# Patient Record
Sex: FEMALE | Race: WHITE | Employment: PART TIME | URBAN - METROPOLITAN AREA
[De-identification: names, ages, dates, MRNs, and addresses within clinical notes are randomized per-mention and may not be internally consistent; named-entity substitution may affect disease eponyms.]

---

## 2018-01-01 ENCOUNTER — TRANSFERRED RECORDS (OUTPATIENT)
Dept: HEALTH INFORMATION MANAGEMENT | Facility: CLINIC | Age: 63
End: 2018-01-01

## 2018-01-01 ENCOUNTER — HOSPITAL ENCOUNTER (INPATIENT)
Facility: CLINIC | Age: 63
LOS: 4 days | Discharge: HOME | End: 2018-11-05
Attending: OBSTETRICS & GYNECOLOGY | Admitting: OBSTETRICS & GYNECOLOGY
Payer: COMMERCIAL

## 2018-01-01 ENCOUNTER — HOSPITAL ENCOUNTER (OUTPATIENT)
Facility: CLINIC | Age: 63
Discharge: HOME | End: 2018-08-17
Attending: RADIOLOGY | Admitting: RADIOLOGY
Payer: COMMERCIAL

## 2018-01-01 ENCOUNTER — TELEPHONE (OUTPATIENT)
Dept: FAMILY MEDICINE | Facility: CLINIC | Age: 63
End: 2018-01-01

## 2018-01-01 ENCOUNTER — APPOINTMENT (OUTPATIENT)
Dept: LAB | Facility: CLINIC | Age: 63
End: 2018-01-01
Payer: COMMERCIAL

## 2018-01-01 ENCOUNTER — APPOINTMENT (OUTPATIENT)
Dept: INTERVENTIONAL RADIOLOGY/VASCULAR | Facility: CLINIC | Age: 63
End: 2018-01-01
Attending: OBSTETRICS & GYNECOLOGY
Payer: COMMERCIAL

## 2018-01-01 ENCOUNTER — OFFICE VISIT (OUTPATIENT)
Dept: FAMILY MEDICINE | Facility: CLINIC | Age: 63
End: 2018-01-01
Payer: COMMERCIAL

## 2018-01-01 ENCOUNTER — ANESTHESIA (OUTPATIENT)
Dept: SURGERY | Facility: CLINIC | Age: 63
End: 2018-01-01
Payer: COMMERCIAL

## 2018-01-01 ENCOUNTER — ANESTHESIA EVENT (OUTPATIENT)
Dept: SURGERY | Facility: CLINIC | Age: 63
End: 2018-01-01
Payer: COMMERCIAL

## 2018-01-01 VITALS
TEMPERATURE: 96.4 F | BODY MASS INDEX: 35.48 KG/M2 | HEART RATE: 99 BPM | RESPIRATION RATE: 16 BRPM | OXYGEN SATURATION: 96 % | DIASTOLIC BLOOD PRESSURE: 75 MMHG | WEIGHT: 234.13 LBS | HEIGHT: 68 IN | SYSTOLIC BLOOD PRESSURE: 145 MMHG

## 2018-01-01 VITALS
OXYGEN SATURATION: 95 % | RESPIRATION RATE: 16 BRPM | TEMPERATURE: 98.2 F | HEIGHT: 69 IN | HEART RATE: 79 BPM | BODY MASS INDEX: 35.7 KG/M2 | SYSTOLIC BLOOD PRESSURE: 116 MMHG | WEIGHT: 241 LBS | DIASTOLIC BLOOD PRESSURE: 83 MMHG

## 2018-01-01 VITALS
TEMPERATURE: 97.1 F | RESPIRATION RATE: 16 BRPM | WEIGHT: 239.5 LBS | SYSTOLIC BLOOD PRESSURE: 150 MMHG | HEIGHT: 68 IN | DIASTOLIC BLOOD PRESSURE: 81 MMHG | OXYGEN SATURATION: 98 % | HEART RATE: 87 BPM | BODY MASS INDEX: 36.3 KG/M2

## 2018-01-01 DIAGNOSIS — C80.0 CARCINOMATOSIS (H): Primary | ICD-10-CM

## 2018-01-01 DIAGNOSIS — C80.0 CARCINOMATOSIS (H): ICD-10-CM

## 2018-01-01 DIAGNOSIS — E03.9 HYPOTHYROIDISM, UNSPECIFIED TYPE: ICD-10-CM

## 2018-01-01 DIAGNOSIS — G62.0 CHEMOTHERAPY-INDUCED PERIPHERAL NEUROPATHY (H): ICD-10-CM

## 2018-01-01 DIAGNOSIS — C57.00 CARCINOMA OF FALLOPIAN TUBE, UNSPECIFIED LATERALITY (H): ICD-10-CM

## 2018-01-01 DIAGNOSIS — Z01.818 PREOP GENERAL PHYSICAL EXAM: Primary | ICD-10-CM

## 2018-01-01 DIAGNOSIS — R18.0 ASCITES, MALIGNANT (H): ICD-10-CM

## 2018-01-01 DIAGNOSIS — C57.00 FALLOPIAN TUBE CARCINOMA (H): ICD-10-CM

## 2018-01-01 DIAGNOSIS — T45.1X5A CHEMOTHERAPY-INDUCED PERIPHERAL NEUROPATHY (H): ICD-10-CM

## 2018-01-01 DIAGNOSIS — G62.9 NEUROPATHY: ICD-10-CM

## 2018-01-01 LAB
ABO + RH BLD: NORMAL
ABO + RH BLD: NORMAL
ALBUMIN SERPL-MCNC: 3.1 G/DL (ref 3.4–5)
ALBUMIN SERPL-MCNC: 3.5 G/DL (ref 3.4–5)
ALP SERPL-CCNC: 38 U/L (ref 40–150)
ALP SERPL-CCNC: 47 U/L (ref 40–150)
ALT SERPL W P-5'-P-CCNC: 18 U/L (ref 0–50)
ALT SERPL W P-5'-P-CCNC: 26 U/L (ref 0–50)
ANION GAP SERPL CALCULATED.3IONS-SCNC: 6 MMOL/L (ref 3–14)
ANION GAP SERPL CALCULATED.3IONS-SCNC: 6 MMOL/L (ref 3–14)
APTT PPP: 25 SEC (ref 22–37)
AST SERPL W P-5'-P-CCNC: 15 U/L (ref 0–45)
AST SERPL W P-5'-P-CCNC: 19 U/L (ref 0–45)
BACTERIA SPEC CULT: NO GROWTH
BACTERIA SPEC CULT: NORMAL
BASOPHILS # BLD AUTO: 0 10E9/L (ref 0–0.2)
BASOPHILS # BLD AUTO: 0 10E9/L (ref 0–0.2)
BASOPHILS NFR BLD AUTO: 0 %
BASOPHILS NFR BLD AUTO: 0.7 %
BILIRUB SERPL-MCNC: 0.2 MG/DL (ref 0.2–1.3)
BILIRUB SERPL-MCNC: 0.5 MG/DL (ref 0.2–1.3)
BLD GP AB SCN SERPL QL: NORMAL
BLOOD BANK CMNT PATIENT-IMP: NORMAL
BUN SERPL-MCNC: 8 MG/DL (ref 7–30)
BUN SERPL-MCNC: 8 MG/DL (ref 7–30)
CALCIUM SERPL-MCNC: 7.6 MG/DL (ref 8.5–10.1)
CALCIUM SERPL-MCNC: 8.6 MG/DL (ref 8.5–10.1)
CHLORIDE SERPL-SCNC: 107 MMOL/L (ref 94–109)
CHLORIDE SERPL-SCNC: 108 MMOL/L (ref 94–109)
CO2 SERPL-SCNC: 26 MMOL/L (ref 20–32)
CO2 SERPL-SCNC: 27 MMOL/L (ref 20–32)
COPATH REPORT: NORMAL
CREAT SERPL-MCNC: 0.67 MG/DL (ref 0.52–1.04)
CREAT SERPL-MCNC: 0.77 MG/DL (ref 0.52–1.04)
CREAT SERPL-MCNC: 0.78 MG/DL (ref 0.52–1.04)
CREAT SERPL-MCNC: 0.86 MG/DL (ref 0.52–1.04)
DIFFERENTIAL METHOD BLD: ABNORMAL
DIFFERENTIAL METHOD BLD: ABNORMAL
EOSINOPHIL # BLD AUTO: 0 10E9/L (ref 0–0.7)
EOSINOPHIL # BLD AUTO: 0 10E9/L (ref 0–0.7)
EOSINOPHIL NFR BLD AUTO: 0.1 %
EOSINOPHIL NFR BLD AUTO: 1 %
ERYTHROCYTE [DISTWIDTH] IN BLOOD BY AUTOMATED COUNT: 18.1 % (ref 10–15)
ERYTHROCYTE [DISTWIDTH] IN BLOOD BY AUTOMATED COUNT: 18.4 % (ref 10–15)
GFR SERPL CREATININE-BSD FRML MDRD: 67 ML/MIN/1.7M2
GFR SERPL CREATININE-BSD FRML MDRD: 75 ML/MIN/1.7M2
GFR SERPL CREATININE-BSD FRML MDRD: 76 ML/MIN/1.7M2
GFR SERPL CREATININE-BSD FRML MDRD: 89 ML/MIN/1.7M2
GLUCOSE BLDC GLUCOMTR-MCNC: 106 MG/DL (ref 70–99)
GLUCOSE SERPL-MCNC: 153 MG/DL (ref 70–99)
GLUCOSE SERPL-MCNC: 84 MG/DL (ref 70–99)
HCT VFR BLD AUTO: 36.3 % (ref 35–47)
HCT VFR BLD AUTO: 37.2 % (ref 35–47)
HGB BLD-MCNC: 11.6 G/DL (ref 11.7–15.7)
HGB BLD-MCNC: 11.7 G/DL (ref 11.7–15.7)
IMM GRANULOCYTES # BLD: 0 10E9/L (ref 0–0.4)
IMM GRANULOCYTES NFR BLD: 0.1 %
INR PPP: 1.01 (ref 0.86–1.14)
LYMPHOCYTES # BLD AUTO: 0.5 10E9/L (ref 0.8–5.3)
LYMPHOCYTES # BLD AUTO: 1 10E9/L (ref 0.8–5.3)
LYMPHOCYTES NFR BLD AUTO: 23.8 %
LYMPHOCYTES NFR BLD AUTO: 8 %
Lab: NORMAL
MAGNESIUM SERPL-MCNC: 2 MG/DL (ref 1.6–2.3)
MCH RBC QN AUTO: 27.7 PG (ref 26.5–33)
MCH RBC QN AUTO: 27.9 PG (ref 26.5–33)
MCHC RBC AUTO-ENTMCNC: 31.5 G/DL (ref 31.5–36.5)
MCHC RBC AUTO-ENTMCNC: 32 G/DL (ref 31.5–36.5)
MCV RBC AUTO: 87 FL (ref 78–100)
MCV RBC AUTO: 88 FL (ref 78–100)
MONOCYTES # BLD AUTO: 0.4 10E9/L (ref 0–1.3)
MONOCYTES # BLD AUTO: 0.5 10E9/L (ref 0–1.3)
MONOCYTES NFR BLD AUTO: 8 %
MONOCYTES NFR BLD AUTO: 9.7 %
NEUTROPHILS # BLD AUTO: 2.7 10E9/L (ref 1.6–8.3)
NEUTROPHILS # BLD AUTO: 5.7 10E9/L (ref 1.6–8.3)
NEUTROPHILS NFR BLD AUTO: 64.8 %
NEUTROPHILS NFR BLD AUTO: 83.8 %
NRBC # BLD AUTO: 0 10*3/UL
NRBC BLD AUTO-RTO: 0 /100
PLATELET # BLD AUTO: 132 10E9/L (ref 150–450)
PLATELET # BLD AUTO: 133 10E9/L (ref 150–450)
PLATELET # BLD AUTO: 148 10E9/L (ref 150–450)
PLATELET # BLD AUTO: 185 10E9/L (ref 150–450)
POTASSIUM SERPL-SCNC: 4 MMOL/L (ref 3.4–5.3)
POTASSIUM SERPL-SCNC: 4.4 MMOL/L (ref 3.4–5.3)
PROT SERPL-MCNC: 6.1 G/DL (ref 6.8–8.8)
PROT SERPL-MCNC: 7.4 G/DL (ref 6.8–8.8)
RBC # BLD AUTO: 4.16 10E12/L (ref 3.8–5.2)
RBC # BLD AUTO: 4.23 10E12/L (ref 3.8–5.2)
SODIUM SERPL-SCNC: 139 MMOL/L (ref 133–144)
SODIUM SERPL-SCNC: 141 MMOL/L (ref 133–144)
SPECIMEN EXP DATE BLD: NORMAL
SPECIMEN SOURCE: NORMAL
SPECIMEN SOURCE: NORMAL
WBC # BLD AUTO: 4.1 10E9/L (ref 4–11)
WBC # BLD AUTO: 6.8 10E9/L (ref 4–11)

## 2018-01-01 PROCEDURE — 27210886 ZZH ACCESSORY CR5

## 2018-01-01 PROCEDURE — 25000132 ZZH RX MED GY IP 250 OP 250 PS 637: Performed by: NURSE PRACTITIONER

## 2018-01-01 PROCEDURE — 85049 AUTOMATED PLATELET COUNT: CPT | Performed by: NURSE PRACTITIONER

## 2018-01-01 PROCEDURE — 25000128 H RX IP 250 OP 636: Performed by: NURSE PRACTITIONER

## 2018-01-01 PROCEDURE — 07TC0ZZ RESECTION OF PELVIS LYMPHATIC, OPEN APPROACH: ICD-10-PCS | Performed by: OBSTETRICS & GYNECOLOGY

## 2018-01-01 PROCEDURE — 27210995 ZZH RX 272: Performed by: OBSTETRICS & GYNECOLOGY

## 2018-01-01 PROCEDURE — 25800025 ZZH RX 258: Performed by: NURSE PRACTITIONER

## 2018-01-01 PROCEDURE — 0DBW0ZZ EXCISION OF PERITONEUM, OPEN APPROACH: ICD-10-PCS | Performed by: OBSTETRICS & GYNECOLOGY

## 2018-01-01 PROCEDURE — 88307 TISSUE EXAM BY PATHOLOGIST: CPT | Performed by: OBSTETRICS & GYNECOLOGY

## 2018-01-01 PROCEDURE — 25000125 ZZHC RX 250: Performed by: NURSE ANESTHETIST, CERTIFIED REGISTERED

## 2018-01-01 PROCEDURE — 25800025 ZZH RX 258: Performed by: OBSTETRICS & GYNECOLOGY

## 2018-01-01 PROCEDURE — 83735 ASSAY OF MAGNESIUM: CPT | Performed by: FAMILY MEDICINE

## 2018-01-01 PROCEDURE — 88307 TISSUE EXAM BY PATHOLOGIST: CPT | Mod: 26 | Performed by: OBSTETRICS & GYNECOLOGY

## 2018-01-01 PROCEDURE — 0UT20ZZ RESECTION OF BILATERAL OVARIES, OPEN APPROACH: ICD-10-PCS | Performed by: OBSTETRICS & GYNECOLOGY

## 2018-01-01 PROCEDURE — 36415 COLL VENOUS BLD VENIPUNCTURE: CPT | Performed by: OBSTETRICS & GYNECOLOGY

## 2018-01-01 PROCEDURE — 0TN60ZZ RELEASE RIGHT URETER, OPEN APPROACH: ICD-10-PCS | Performed by: OBSTETRICS & GYNECOLOGY

## 2018-01-01 PROCEDURE — 25000128 H RX IP 250 OP 636: Performed by: ANESTHESIOLOGY

## 2018-01-01 PROCEDURE — 0TN70ZZ RELEASE LEFT URETER, OPEN APPROACH: ICD-10-PCS | Performed by: OBSTETRICS & GYNECOLOGY

## 2018-01-01 PROCEDURE — 25000125 ZZHC RX 250

## 2018-01-01 PROCEDURE — 76937 US GUIDE VASCULAR ACCESS: CPT

## 2018-01-01 PROCEDURE — 0UT90ZZ RESECTION OF UTERUS, OPEN APPROACH: ICD-10-PCS | Performed by: OBSTETRICS & GYNECOLOGY

## 2018-01-01 PROCEDURE — 88305 TISSUE EXAM BY PATHOLOGIST: CPT | Mod: 26 | Performed by: OBSTETRICS & GYNECOLOGY

## 2018-01-01 PROCEDURE — 25000128 H RX IP 250 OP 636: Performed by: OBSTETRICS & GYNECOLOGY

## 2018-01-01 PROCEDURE — 12000000 ZZH R&B MED SURG/OB

## 2018-01-01 PROCEDURE — 36415 COLL VENOUS BLD VENIPUNCTURE: CPT | Performed by: RADIOLOGY

## 2018-01-01 PROCEDURE — 88309 TISSUE EXAM BY PATHOLOGIST: CPT | Performed by: OBSTETRICS & GYNECOLOGY

## 2018-01-01 PROCEDURE — C1765 ADHESION BARRIER: HCPCS | Performed by: OBSTETRICS & GYNECOLOGY

## 2018-01-01 PROCEDURE — 85730 THROMBOPLASTIN TIME PARTIAL: CPT | Performed by: RADIOLOGY

## 2018-01-01 PROCEDURE — 71000012 ZZH RECOVERY PHASE 1 LEVEL 1 FIRST HR: Performed by: OBSTETRICS & GYNECOLOGY

## 2018-01-01 PROCEDURE — 0DNW0ZZ RELEASE PERITONEUM, OPEN APPROACH: ICD-10-PCS | Performed by: OBSTETRICS & GYNECOLOGY

## 2018-01-01 PROCEDURE — 86900 BLOOD TYPING SEROLOGIC ABO: CPT | Performed by: OBSTETRICS & GYNECOLOGY

## 2018-01-01 PROCEDURE — 00000155 ZZHCL STATISTIC H-CELL BLOCK W/STAIN: Performed by: OBSTETRICS & GYNECOLOGY

## 2018-01-01 PROCEDURE — 86901 BLOOD TYPING SEROLOGIC RH(D): CPT | Performed by: OBSTETRICS & GYNECOLOGY

## 2018-01-01 PROCEDURE — 82565 ASSAY OF CREATININE: CPT | Performed by: NURSE PRACTITIONER

## 2018-01-01 PROCEDURE — 85025 COMPLETE CBC W/AUTO DIFF WBC: CPT | Performed by: NURSE PRACTITIONER

## 2018-01-01 PROCEDURE — 85610 PROTHROMBIN TIME: CPT | Performed by: RADIOLOGY

## 2018-01-01 PROCEDURE — 27210905 ZZH KIT CR7

## 2018-01-01 PROCEDURE — 99152 MOD SED SAME PHYS/QHP 5/>YRS: CPT

## 2018-01-01 PROCEDURE — 25000566 ZZH SEVOFLURANE, EA 15 MIN: Performed by: OBSTETRICS & GYNECOLOGY

## 2018-01-01 PROCEDURE — C1769 GUIDE WIRE: HCPCS

## 2018-01-01 PROCEDURE — 36415 COLL VENOUS BLD VENIPUNCTURE: CPT | Performed by: FAMILY MEDICINE

## 2018-01-01 PROCEDURE — 88112 CYTOPATH CELL ENHANCE TECH: CPT | Mod: 26 | Performed by: OBSTETRICS & GYNECOLOGY

## 2018-01-01 PROCEDURE — 99214 OFFICE O/P EST MOD 30 MIN: CPT | Performed by: FAMILY MEDICINE

## 2018-01-01 PROCEDURE — 0UT70ZZ RESECTION OF BILATERAL FALLOPIAN TUBES, OPEN APPROACH: ICD-10-PCS | Performed by: OBSTETRICS & GYNECOLOGY

## 2018-01-01 PROCEDURE — 88309 TISSUE EXAM BY PATHOLOGIST: CPT | Mod: 26,59 | Performed by: OBSTETRICS & GYNECOLOGY

## 2018-01-01 PROCEDURE — 25000125 ZZHC RX 250: Performed by: OBSTETRICS & GYNECOLOGY

## 2018-01-01 PROCEDURE — 80053 COMPREHEN METABOLIC PANEL: CPT | Performed by: FAMILY MEDICINE

## 2018-01-01 PROCEDURE — 88305 TISSUE EXAM BY PATHOLOGIST: CPT | Performed by: OBSTETRICS & GYNECOLOGY

## 2018-01-01 PROCEDURE — 25000125 ZZHC RX 250: Performed by: NURSE PRACTITIONER

## 2018-01-01 PROCEDURE — 27210794 ZZH OR GENERAL SUPPLY STERILE: Performed by: OBSTETRICS & GYNECOLOGY

## 2018-01-01 PROCEDURE — 36000093 ZZH SURGERY LEVEL 4 1ST 30 MIN: Performed by: OBSTETRICS & GYNECOLOGY

## 2018-01-01 PROCEDURE — 25000128 H RX IP 250 OP 636

## 2018-01-01 PROCEDURE — 40000170 ZZH STATISTIC PRE-PROCEDURE ASSESSMENT II: Performed by: OBSTETRICS & GYNECOLOGY

## 2018-01-01 PROCEDURE — 36000063 ZZH SURGERY LEVEL 4 EA 15 ADDTL MIN: Performed by: OBSTETRICS & GYNECOLOGY

## 2018-01-01 PROCEDURE — 25000128 H RX IP 250 OP 636: Performed by: NURSE ANESTHETIST, CERTIFIED REGISTERED

## 2018-01-01 PROCEDURE — 88112 CYTOPATH CELL ENHANCE TECH: CPT | Performed by: OBSTETRICS & GYNECOLOGY

## 2018-01-01 PROCEDURE — 25000128 H RX IP 250 OP 636: Performed by: RADIOLOGY

## 2018-01-01 PROCEDURE — C1788 PORT, INDWELLING, IMP: HCPCS

## 2018-01-01 PROCEDURE — 40000854 ZZH STATISTIC SIMPLE TUBE INSERTION/CHARGE, PORT, CATH, FISTULOGRAM

## 2018-01-01 PROCEDURE — 36415 COLL VENOUS BLD VENIPUNCTURE: CPT | Performed by: NURSE PRACTITIONER

## 2018-01-01 PROCEDURE — 37000008 ZZH ANESTHESIA TECHNICAL FEE, 1ST 30 MIN: Performed by: OBSTETRICS & GYNECOLOGY

## 2018-01-01 PROCEDURE — 25000132 ZZH RX MED GY IP 250 OP 250 PS 637: Performed by: OBSTETRICS & GYNECOLOGY

## 2018-01-01 PROCEDURE — 0DBU0ZZ EXCISION OF OMENTUM, OPEN APPROACH: ICD-10-PCS | Performed by: OBSTETRICS & GYNECOLOGY

## 2018-01-01 PROCEDURE — 85025 COMPLETE CBC W/AUTO DIFF WBC: CPT | Performed by: FAMILY MEDICINE

## 2018-01-01 PROCEDURE — 25000131 ZZH RX MED GY IP 250 OP 636 PS 637: Performed by: NURSE PRACTITIONER

## 2018-01-01 PROCEDURE — 71000013 ZZH RECOVERY PHASE 1 LEVEL 1 EA ADDTL HR: Performed by: OBSTETRICS & GYNECOLOGY

## 2018-01-01 PROCEDURE — 80053 COMPREHEN METABOLIC PANEL: CPT | Performed by: NURSE PRACTITIONER

## 2018-01-01 PROCEDURE — 86850 RBC ANTIBODY SCREEN: CPT | Performed by: OBSTETRICS & GYNECOLOGY

## 2018-01-01 PROCEDURE — 27211193 ZZ H WOUND GLUE CR1

## 2018-01-01 PROCEDURE — 00000146 ZZHCL STATISTIC GLUCOSE BY METER IP

## 2018-01-01 PROCEDURE — 37000009 ZZH ANESTHESIA TECHNICAL FEE, EACH ADDTL 15 MIN: Performed by: OBSTETRICS & GYNECOLOGY

## 2018-01-01 RX ORDER — FLUMAZENIL 0.1 MG/ML
0.2 INJECTION, SOLUTION INTRAVENOUS
Status: DISCONTINUED | OUTPATIENT
Start: 2018-01-01 | End: 2018-01-01 | Stop reason: HOSPADM

## 2018-01-01 RX ORDER — ONDANSETRON 2 MG/ML
4 INJECTION INTRAMUSCULAR; INTRAVENOUS EVERY 30 MIN PRN
Status: DISCONTINUED | OUTPATIENT
Start: 2018-01-01 | End: 2018-01-01 | Stop reason: HOSPADM

## 2018-01-01 RX ORDER — IBUPROFEN 600 MG/1
600 TABLET, FILM COATED ORAL EVERY 6 HOURS PRN
Qty: 30 TABLET | Refills: 1 | Status: SHIPPED | OUTPATIENT
Start: 2018-01-01 | End: 2018-01-01

## 2018-01-01 RX ORDER — HYDROCODONE BITARTRATE AND ACETAMINOPHEN 5; 325 MG/1; MG/1
1-2 TABLET ORAL EVERY 4 HOURS PRN
Status: DISCONTINUED | OUTPATIENT
Start: 2018-01-01 | End: 2018-01-01 | Stop reason: HOSPADM

## 2018-01-01 RX ORDER — ONDANSETRON 2 MG/ML
4 INJECTION INTRAMUSCULAR; INTRAVENOUS EVERY 6 HOURS PRN
Status: DISCONTINUED | OUTPATIENT
Start: 2018-01-01 | End: 2018-01-01 | Stop reason: HOSPADM

## 2018-01-01 RX ORDER — CEFAZOLIN SODIUM 2 G/100ML
2 INJECTION, SOLUTION INTRAVENOUS
Status: COMPLETED | OUTPATIENT
Start: 2018-01-01 | End: 2018-01-01

## 2018-01-01 RX ORDER — LORAZEPAM 0.5 MG/1
0.5 TABLET ORAL AT BEDTIME
Status: DISCONTINUED | OUTPATIENT
Start: 2018-01-01 | End: 2018-01-01 | Stop reason: HOSPADM

## 2018-01-01 RX ORDER — LIDOCAINE 40 MG/G
CREAM TOPICAL
Status: DISCONTINUED | OUTPATIENT
Start: 2018-01-01 | End: 2018-01-01 | Stop reason: HOSPADM

## 2018-01-01 RX ORDER — AMOXICILLIN 250 MG
2 CAPSULE ORAL 2 TIMES DAILY
Status: DISCONTINUED | OUTPATIENT
Start: 2018-01-01 | End: 2018-01-01 | Stop reason: HOSPADM

## 2018-01-01 RX ORDER — HYDROMORPHONE HYDROCHLORIDE 1 MG/ML
.3-.5 INJECTION, SOLUTION INTRAMUSCULAR; INTRAVENOUS; SUBCUTANEOUS EVERY 10 MIN PRN
Status: DISCONTINUED | OUTPATIENT
Start: 2018-01-01 | End: 2018-01-01 | Stop reason: HOSPADM

## 2018-01-01 RX ORDER — DEXAMETHASONE SODIUM PHOSPHATE 4 MG/ML
INJECTION, SOLUTION INTRA-ARTICULAR; INTRALESIONAL; INTRAMUSCULAR; INTRAVENOUS; SOFT TISSUE PRN
Status: DISCONTINUED | OUTPATIENT
Start: 2018-01-01 | End: 2018-01-01

## 2018-01-01 RX ORDER — CEFAZOLIN SODIUM 2 G/100ML
2 INJECTION, SOLUTION INTRAVENOUS EVERY 8 HOURS
Status: DISCONTINUED | OUTPATIENT
Start: 2018-01-01 | End: 2018-01-01

## 2018-01-01 RX ORDER — DEXTROSE MONOHYDRATE, SODIUM CHLORIDE, AND POTASSIUM CHLORIDE 50; 1.49; 4.5 G/1000ML; G/1000ML; G/1000ML
INJECTION, SOLUTION INTRAVENOUS CONTINUOUS
Status: DISCONTINUED | OUTPATIENT
Start: 2018-01-01 | End: 2018-01-01

## 2018-01-01 RX ORDER — FENTANYL CITRATE 50 UG/ML
INJECTION, SOLUTION INTRAMUSCULAR; INTRAVENOUS
Status: COMPLETED
Start: 2018-01-01 | End: 2018-01-01

## 2018-01-01 RX ORDER — LIDOCAINE HYDROCHLORIDE 20 MG/ML
INJECTION, SOLUTION INFILTRATION; PERINEURAL PRN
Status: DISCONTINUED | OUTPATIENT
Start: 2018-01-01 | End: 2018-01-01

## 2018-01-01 RX ORDER — PROPOFOL 10 MG/ML
INJECTION, EMULSION INTRAVENOUS PRN
Status: DISCONTINUED | OUTPATIENT
Start: 2018-01-01 | End: 2018-01-01

## 2018-01-01 RX ORDER — ONDANSETRON 4 MG/1
4 TABLET, ORALLY DISINTEGRATING ORAL EVERY 6 HOURS PRN
Status: DISCONTINUED | OUTPATIENT
Start: 2018-01-01 | End: 2018-01-01 | Stop reason: HOSPADM

## 2018-01-01 RX ORDER — PROPOFOL 10 MG/ML
INJECTION, EMULSION INTRAVENOUS CONTINUOUS PRN
Status: DISCONTINUED | OUTPATIENT
Start: 2018-01-01 | End: 2018-01-01

## 2018-01-01 RX ORDER — MEPERIDINE HYDROCHLORIDE 25 MG/ML
12.5 INJECTION INTRAMUSCULAR; INTRAVENOUS; SUBCUTANEOUS
Status: COMPLETED | OUTPATIENT
Start: 2018-01-01 | End: 2018-01-01

## 2018-01-01 RX ORDER — ACETYLCYSTEINE 600 MG
1 CAPSULE ORAL DAILY
COMMUNITY
End: 2018-01-01

## 2018-01-01 RX ORDER — NICOTINE POLACRILEX 4 MG
15-30 LOZENGE BUCCAL
Status: DISCONTINUED | OUTPATIENT
Start: 2018-01-01 | End: 2018-01-01 | Stop reason: HOSPADM

## 2018-01-01 RX ORDER — NALOXONE HYDROCHLORIDE 0.4 MG/ML
.1-.4 INJECTION, SOLUTION INTRAMUSCULAR; INTRAVENOUS; SUBCUTANEOUS
Status: DISCONTINUED | OUTPATIENT
Start: 2018-01-01 | End: 2018-01-01 | Stop reason: HOSPADM

## 2018-01-01 RX ORDER — LORAZEPAM 0.5 MG/1
0.5 TABLET ORAL 2 TIMES DAILY PRN
Status: DISCONTINUED | OUTPATIENT
Start: 2018-01-01 | End: 2018-01-01 | Stop reason: HOSPADM

## 2018-01-01 RX ORDER — HEPARIN SODIUM,PORCINE 10 UNIT/ML
5-10 VIAL (ML) INTRAVENOUS EVERY 24 HOURS
Status: DISCONTINUED | OUTPATIENT
Start: 2018-01-01 | End: 2018-01-01 | Stop reason: HOSPADM

## 2018-01-01 RX ORDER — SODIUM CHLORIDE, SODIUM LACTATE, POTASSIUM CHLORIDE, CALCIUM CHLORIDE 600; 310; 30; 20 MG/100ML; MG/100ML; MG/100ML; MG/100ML
INJECTION, SOLUTION INTRAVENOUS CONTINUOUS
Status: DISCONTINUED | OUTPATIENT
Start: 2018-01-01 | End: 2018-01-01 | Stop reason: HOSPADM

## 2018-01-01 RX ORDER — HEPARIN SODIUM,PORCINE 10 UNIT/ML
5-10 VIAL (ML) INTRAVENOUS
Status: DISCONTINUED | OUTPATIENT
Start: 2018-01-01 | End: 2018-01-01 | Stop reason: HOSPADM

## 2018-01-01 RX ORDER — FENTANYL CITRATE 50 UG/ML
INJECTION, SOLUTION INTRAMUSCULAR; INTRAVENOUS
Status: DISCONTINUED
Start: 2018-01-01 | End: 2018-01-01 | Stop reason: HOSPADM

## 2018-01-01 RX ORDER — LIDOCAINE HYDROCHLORIDE 10 MG/ML
INJECTION, SOLUTION INFILTRATION; PERINEURAL
Status: DISCONTINUED
Start: 2018-01-01 | End: 2018-01-01 | Stop reason: HOSPADM

## 2018-01-01 RX ORDER — IBUPROFEN 600 MG/1
600 TABLET, FILM COATED ORAL EVERY 6 HOURS PRN
Qty: 30 TABLET | Refills: 1
Start: 2018-01-01

## 2018-01-01 RX ORDER — AMOXICILLIN 250 MG
1-2 CAPSULE ORAL AT BEDTIME
COMMUNITY
End: 2019-01-01

## 2018-01-01 RX ORDER — LORAZEPAM 0.5 MG/1
0.5 TABLET ORAL AT BEDTIME
Qty: 60 TABLET | Refills: 1 | Status: SHIPPED | OUTPATIENT
Start: 2018-01-01

## 2018-01-01 RX ORDER — HYDROMORPHONE HYDROCHLORIDE 1 MG/ML
0.5 INJECTION, SOLUTION INTRAMUSCULAR; INTRAVENOUS; SUBCUTANEOUS EVERY 4 HOURS PRN
Status: DISCONTINUED | OUTPATIENT
Start: 2018-01-01 | End: 2018-01-01 | Stop reason: HOSPADM

## 2018-01-01 RX ORDER — CHOLECALCIFEROL (VITAMIN D3) 1MM UNIT/G
2000 LIQUID (GRAM) MISCELLANEOUS DAILY
COMMUNITY

## 2018-01-01 RX ORDER — LIDOCAINE HYDROCHLORIDE 10 MG/ML
1-30 INJECTION, SOLUTION EPIDURAL; INFILTRATION; INTRACAUDAL; PERINEURAL
Status: COMPLETED | OUTPATIENT
Start: 2018-01-01 | End: 2018-01-01

## 2018-01-01 RX ORDER — ONDANSETRON 2 MG/ML
INJECTION INTRAMUSCULAR; INTRAVENOUS PRN
Status: DISCONTINUED | OUTPATIENT
Start: 2018-01-01 | End: 2018-01-01

## 2018-01-01 RX ORDER — SENNOSIDES A AND B 8.6 MG/1
1-3 TABLET, FILM COATED ORAL 2 TIMES DAILY PRN
Qty: 30 TABLET | Refills: 0 | Status: SHIPPED | OUTPATIENT
Start: 2018-01-01

## 2018-01-01 RX ORDER — HEPARIN SODIUM (PORCINE) LOCK FLUSH IV SOLN 100 UNIT/ML 100 UNIT/ML
5 SOLUTION INTRAVENOUS
Status: DISCONTINUED | OUTPATIENT
Start: 2018-01-01 | End: 2018-01-01 | Stop reason: HOSPADM

## 2018-01-01 RX ORDER — CEFAZOLIN SODIUM 1 G/3ML
1 INJECTION, POWDER, FOR SOLUTION INTRAMUSCULAR; INTRAVENOUS SEE ADMIN INSTRUCTIONS
Status: DISCONTINUED | OUTPATIENT
Start: 2018-01-01 | End: 2018-01-01 | Stop reason: HOSPADM

## 2018-01-01 RX ORDER — FENTANYL CITRATE 50 UG/ML
INJECTION, SOLUTION INTRAMUSCULAR; INTRAVENOUS PRN
Status: DISCONTINUED | OUTPATIENT
Start: 2018-01-01 | End: 2018-01-01

## 2018-01-01 RX ORDER — HYDROCODONE BITARTRATE AND ACETAMINOPHEN 5; 325 MG/1; MG/1
1-2 TABLET ORAL EVERY 4 HOURS PRN
Qty: 25 TABLET | Refills: 0 | Status: SHIPPED | OUTPATIENT
Start: 2018-01-01

## 2018-01-01 RX ORDER — FENTANYL CITRATE 50 UG/ML
25-50 INJECTION, SOLUTION INTRAMUSCULAR; INTRAVENOUS EVERY 5 MIN PRN
Status: DISCONTINUED | OUTPATIENT
Start: 2018-01-01 | End: 2018-01-01 | Stop reason: HOSPADM

## 2018-01-01 RX ORDER — FENTANYL CITRATE 50 UG/ML
25-50 INJECTION, SOLUTION INTRAMUSCULAR; INTRAVENOUS
Status: DISCONTINUED | OUTPATIENT
Start: 2018-01-01 | End: 2018-01-01 | Stop reason: HOSPADM

## 2018-01-01 RX ORDER — DEXTROSE MONOHYDRATE 25 G/50ML
25-50 INJECTION, SOLUTION INTRAVENOUS
Status: DISCONTINUED | OUTPATIENT
Start: 2018-01-01 | End: 2018-01-01 | Stop reason: HOSPADM

## 2018-01-01 RX ORDER — LIDOCAINE 40 MG/G
CREAM TOPICAL
Status: CANCELLED | OUTPATIENT
Start: 2018-01-01

## 2018-01-01 RX ORDER — ONDANSETRON 4 MG/1
4 TABLET, ORALLY DISINTEGRATING ORAL EVERY 30 MIN PRN
Status: DISCONTINUED | OUTPATIENT
Start: 2018-01-01 | End: 2018-01-01 | Stop reason: HOSPADM

## 2018-01-01 RX ORDER — MAGNESIUM HYDROXIDE 1200 MG/15ML
LIQUID ORAL PRN
Status: DISCONTINUED | OUTPATIENT
Start: 2018-01-01 | End: 2018-01-01 | Stop reason: HOSPADM

## 2018-01-01 RX ORDER — EPHEDRINE SULFATE 50 MG/ML
INJECTION, SOLUTION INTRAMUSCULAR; INTRAVENOUS; SUBCUTANEOUS PRN
Status: DISCONTINUED | OUTPATIENT
Start: 2018-01-01 | End: 2018-01-01

## 2018-01-01 RX ORDER — HYDROMORPHONE HYDROCHLORIDE 1 MG/ML
0.5 SOLUTION ORAL EVERY 4 HOURS PRN
Status: DISCONTINUED | OUTPATIENT
Start: 2018-01-01 | End: 2018-01-01

## 2018-01-01 RX ORDER — LIDOCAINE 40 MG/G
CREAM TOPICAL
Status: DISCONTINUED | OUTPATIENT
Start: 2018-01-01 | End: 2018-01-01

## 2018-01-01 RX ORDER — HEPARIN SODIUM,PORCINE 10 UNIT/ML
5 VIAL (ML) INTRAVENOUS EVERY 24 HOURS
Status: DISCONTINUED | OUTPATIENT
Start: 2018-01-01 | End: 2018-01-01 | Stop reason: HOSPADM

## 2018-01-01 RX ORDER — PROCHLORPERAZINE MALEATE 10 MG
10 TABLET ORAL EVERY 6 HOURS PRN
Status: DISCONTINUED | OUTPATIENT
Start: 2018-01-01 | End: 2018-01-01 | Stop reason: HOSPADM

## 2018-01-01 RX ORDER — HEPARIN SODIUM (PORCINE) LOCK FLUSH IV SOLN 100 UNIT/ML 100 UNIT/ML
SOLUTION INTRAVENOUS
Status: DISCONTINUED
Start: 2018-01-01 | End: 2018-01-01 | Stop reason: HOSPADM

## 2018-01-01 RX ORDER — AMOXICILLIN 250 MG
1 CAPSULE ORAL 2 TIMES DAILY
Status: DISCONTINUED | OUTPATIENT
Start: 2018-01-01 | End: 2018-01-01 | Stop reason: HOSPADM

## 2018-01-01 RX ORDER — KETOROLAC TROMETHAMINE 30 MG/ML
30 INJECTION, SOLUTION INTRAMUSCULAR; INTRAVENOUS EVERY 6 HOURS
Status: DISCONTINUED | OUTPATIENT
Start: 2018-01-01 | End: 2018-01-01

## 2018-01-01 RX ORDER — VECURONIUM BROMIDE 1 MG/ML
INJECTION, POWDER, LYOPHILIZED, FOR SOLUTION INTRAVENOUS PRN
Status: DISCONTINUED | OUTPATIENT
Start: 2018-01-01 | End: 2018-01-01

## 2018-01-01 RX ADMIN — SENNOSIDES AND DOCUSATE SODIUM 2 TABLET: 8.6; 5 TABLET ORAL at 19:32

## 2018-01-01 RX ADMIN — CEFAZOLIN 1 G: 1 INJECTION, POWDER, FOR SOLUTION INTRAMUSCULAR; INTRAVENOUS at 18:16

## 2018-01-01 RX ADMIN — SODIUM CHLORIDE, PRESERVATIVE FREE 5 ML: 5 INJECTION INTRAVENOUS at 01:30

## 2018-01-01 RX ADMIN — FENTANYL CITRATE 25 MCG: 50 INJECTION, SOLUTION INTRAMUSCULAR; INTRAVENOUS at 13:39

## 2018-01-01 RX ADMIN — Medication 5 MG: at 16:57

## 2018-01-01 RX ADMIN — VECURONIUM BROMIDE 2 MG: 1 INJECTION, POWDER, LYOPHILIZED, FOR SOLUTION INTRAVENOUS at 16:29

## 2018-01-01 RX ADMIN — LEVOTHYROXINE SODIUM 125 MCG: 75 TABLET ORAL at 08:45

## 2018-01-01 RX ADMIN — HYDROMORPHONE HYDROCHLORIDE 0.5 MG: 1 INJECTION, SOLUTION INTRAMUSCULAR; INTRAVENOUS; SUBCUTANEOUS at 15:44

## 2018-01-01 RX ADMIN — ENOXAPARIN SODIUM 40 MG: 40 INJECTION SUBCUTANEOUS at 18:12

## 2018-01-01 RX ADMIN — CEFAZOLIN SODIUM 2 G: 2 INJECTION, SOLUTION INTRAVENOUS at 10:45

## 2018-01-01 RX ADMIN — MIDAZOLAM HYDROCHLORIDE 0.5 MG: 1 INJECTION, SOLUTION INTRAMUSCULAR; INTRAVENOUS at 13:39

## 2018-01-01 RX ADMIN — SODIUM CHLORIDE, PRESERVATIVE FREE 5 ML: 5 INJECTION INTRAVENOUS at 05:57

## 2018-01-01 RX ADMIN — HYDROMORPHONE HYDROCHLORIDE 0.5 MG: 1 INJECTION, SOLUTION INTRAMUSCULAR; INTRAVENOUS; SUBCUTANEOUS at 01:26

## 2018-01-01 RX ADMIN — CEFAZOLIN SODIUM 2 G: 2 INJECTION, SOLUTION INTRAVENOUS at 18:12

## 2018-01-01 RX ADMIN — MIDAZOLAM HYDROCHLORIDE 0.5 MG: 1 INJECTION, SOLUTION INTRAMUSCULAR; INTRAVENOUS at 13:25

## 2018-01-01 RX ADMIN — HYDROCODONE BITARTRATE AND ACETAMINOPHEN 1 TABLET: 5; 325 TABLET ORAL at 11:27

## 2018-01-01 RX ADMIN — Medication 5 ML: at 11:23

## 2018-01-01 RX ADMIN — PROCHLORPERAZINE EDISYLATE 10 MG: 5 INJECTION INTRAMUSCULAR; INTRAVENOUS at 19:50

## 2018-01-01 RX ADMIN — HYDROCODONE BITARTRATE AND ACETAMINOPHEN 1 TABLET: 5; 325 TABLET ORAL at 08:21

## 2018-01-01 RX ADMIN — MEPERIDINE HYDROCHLORIDE 12.5 MG: 25 INJECTION INTRAMUSCULAR; INTRAVENOUS; SUBCUTANEOUS at 19:47

## 2018-01-01 RX ADMIN — MIDAZOLAM HYDROCHLORIDE 1 MG: 1 INJECTION, SOLUTION INTRAMUSCULAR; INTRAVENOUS at 13:21

## 2018-01-01 RX ADMIN — HYDROMORPHONE HYDROCHLORIDE 0.5 MG: 1 INJECTION, SOLUTION INTRAMUSCULAR; INTRAVENOUS; SUBCUTANEOUS at 19:04

## 2018-01-01 RX ADMIN — POTASSIUM CHLORIDE, DEXTROSE MONOHYDRATE AND SODIUM CHLORIDE: 150; 5; 450 INJECTION, SOLUTION INTRAVENOUS at 09:46

## 2018-01-01 RX ADMIN — MIDAZOLAM HYDROCHLORIDE 1 MG: 1 INJECTION, SOLUTION INTRAMUSCULAR; INTRAVENOUS at 15:48

## 2018-01-01 RX ADMIN — DEXAMETHASONE SODIUM PHOSPHATE 4 MG: 4 INJECTION, SOLUTION INTRA-ARTICULAR; INTRALESIONAL; INTRAMUSCULAR; INTRAVENOUS; SOFT TISSUE at 16:38

## 2018-01-01 RX ADMIN — FENTANYL CITRATE 50 MCG: 50 INJECTION, SOLUTION INTRAMUSCULAR; INTRAVENOUS at 13:22

## 2018-01-01 RX ADMIN — LORAZEPAM 0.5 MG: 0.5 TABLET ORAL at 21:51

## 2018-01-01 RX ADMIN — LORAZEPAM 0.5 MG: 0.5 TABLET ORAL at 22:20

## 2018-01-01 RX ADMIN — FENTANYL CITRATE 50 MCG: 50 INJECTION, SOLUTION INTRAMUSCULAR; INTRAVENOUS at 17:34

## 2018-01-01 RX ADMIN — Medication 5 ML: at 08:22

## 2018-01-01 RX ADMIN — VECURONIUM BROMIDE 2 MG: 1 INJECTION, POWDER, LYOPHILIZED, FOR SOLUTION INTRAVENOUS at 16:20

## 2018-01-01 RX ADMIN — CEFAZOLIN SODIUM 2 G: 2 INJECTION, SOLUTION INTRAVENOUS at 13:04

## 2018-01-01 RX ADMIN — CEFAZOLIN SODIUM 2 G: 2 INJECTION, SOLUTION INTRAVENOUS at 01:56

## 2018-01-01 RX ADMIN — CEFAZOLIN SODIUM 2 G: 2 INJECTION, SOLUTION INTRAVENOUS at 01:47

## 2018-01-01 RX ADMIN — ONDANSETRON 4 MG: 2 INJECTION INTRAMUSCULAR; INTRAVENOUS at 06:29

## 2018-01-01 RX ADMIN — FENTANYL CITRATE 100 MCG: 50 INJECTION, SOLUTION INTRAMUSCULAR; INTRAVENOUS at 16:08

## 2018-01-01 RX ADMIN — SENNOSIDES AND DOCUSATE SODIUM 2 TABLET: 8.6; 5 TABLET ORAL at 20:52

## 2018-01-01 RX ADMIN — LEVOTHYROXINE SODIUM 125 MCG: 75 TABLET ORAL at 07:39

## 2018-01-01 RX ADMIN — SODIUM CHLORIDE, PRESERVATIVE FREE 5 ML: 5 INJECTION INTRAVENOUS at 19:32

## 2018-01-01 RX ADMIN — PROCHLORPERAZINE EDISYLATE 10 MG: 5 INJECTION INTRAMUSCULAR; INTRAVENOUS at 19:52

## 2018-01-01 RX ADMIN — HYDROMORPHONE HYDROCHLORIDE 0.5 MG: 1 INJECTION, SOLUTION INTRAMUSCULAR; INTRAVENOUS; SUBCUTANEOUS at 19:28

## 2018-01-01 RX ADMIN — FENTANYL CITRATE 50 MCG: 50 INJECTION, SOLUTION INTRAMUSCULAR; INTRAVENOUS at 16:35

## 2018-01-01 RX ADMIN — SENNOSIDES AND DOCUSATE SODIUM 2 TABLET: 8.6; 5 TABLET ORAL at 07:29

## 2018-01-01 RX ADMIN — SODIUM CHLORIDE, POTASSIUM CHLORIDE, SODIUM LACTATE AND CALCIUM CHLORIDE: 600; 310; 30; 20 INJECTION, SOLUTION INTRAVENOUS at 18:45

## 2018-01-01 RX ADMIN — POTASSIUM CHLORIDE, DEXTROSE MONOHYDRATE AND SODIUM CHLORIDE: 150; 5; 450 INJECTION, SOLUTION INTRAVENOUS at 00:00

## 2018-01-01 RX ADMIN — FENTANYL CITRATE 25 MCG: 50 INJECTION, SOLUTION INTRAMUSCULAR; INTRAVENOUS at 13:30

## 2018-01-01 RX ADMIN — HEPARIN SODIUM (PORCINE) LOCK FLUSH IV SOLN 100 UNIT/ML 500 UNITS: 100 SOLUTION at 13:49

## 2018-01-01 RX ADMIN — HYDROCODONE BITARTRATE AND ACETAMINOPHEN 2 TABLET: 5; 325 TABLET ORAL at 14:49

## 2018-01-01 RX ADMIN — CEFAZOLIN SODIUM 2 G: 2 INJECTION, SOLUTION INTRAVENOUS at 01:09

## 2018-01-01 RX ADMIN — VECURONIUM BROMIDE 2 MG: 1 INJECTION, POWDER, LYOPHILIZED, FOR SOLUTION INTRAVENOUS at 17:42

## 2018-01-01 RX ADMIN — HEPARIN 5 ML: 100 SYRINGE at 13:51

## 2018-01-01 RX ADMIN — HYDROCODONE BITARTRATE AND ACETAMINOPHEN 1 TABLET: 5; 325 TABLET ORAL at 10:27

## 2018-01-01 RX ADMIN — MIDAZOLAM HYDROCHLORIDE 0.5 MG: 1 INJECTION, SOLUTION INTRAMUSCULAR; INTRAVENOUS at 13:29

## 2018-01-01 RX ADMIN — PROPOFOL 50 MCG/KG/MIN: 10 INJECTION, EMULSION INTRAVENOUS at 16:41

## 2018-01-01 RX ADMIN — PROCHLORPERAZINE MALEATE 10 MG: 10 TABLET, FILM COATED ORAL at 11:23

## 2018-01-01 RX ADMIN — LIDOCAINE HYDROCHLORIDE 28 ML: 10 INJECTION, SOLUTION EPIDURAL; INFILTRATION; INTRACAUDAL; PERINEURAL at 13:50

## 2018-01-01 RX ADMIN — HEPARIN SODIUM 10000 UNITS: 10000 INJECTION, SOLUTION INTRAVENOUS; SUBCUTANEOUS at 13:12

## 2018-01-01 RX ADMIN — SENNOSIDES AND DOCUSATE SODIUM 2 TABLET: 8.6; 5 TABLET ORAL at 20:00

## 2018-01-01 RX ADMIN — HYDROCODONE BITARTRATE AND ACETAMINOPHEN 2 TABLET: 5; 325 TABLET ORAL at 02:50

## 2018-01-01 RX ADMIN — SENNOSIDES AND DOCUSATE SODIUM 2 TABLET: 8.6; 5 TABLET ORAL at 08:20

## 2018-01-01 RX ADMIN — LEVOTHYROXINE SODIUM 125 MCG: 75 TABLET ORAL at 07:29

## 2018-01-01 RX ADMIN — ROCURONIUM BROMIDE 50 MG: 10 INJECTION INTRAVENOUS at 16:09

## 2018-01-01 RX ADMIN — FENTANYL CITRATE 50 MCG: 50 INJECTION, SOLUTION INTRAMUSCULAR; INTRAVENOUS at 19:13

## 2018-01-01 RX ADMIN — HYDROCODONE BITARTRATE AND ACETAMINOPHEN 2 TABLET: 5; 325 TABLET ORAL at 18:56

## 2018-01-01 RX ADMIN — HYDROMORPHONE HYDROCHLORIDE 0.5 MG: 1 INJECTION, SOLUTION INTRAMUSCULAR; INTRAVENOUS; SUBCUTANEOUS at 05:56

## 2018-01-01 RX ADMIN — LIDOCAINE HYDROCHLORIDE 28 ML: 10 INJECTION, SOLUTION INFILTRATION; PERINEURAL at 13:50

## 2018-01-01 RX ADMIN — CEFAZOLIN SODIUM 2 G: 2 INJECTION, SOLUTION INTRAVENOUS at 10:43

## 2018-01-01 RX ADMIN — MIDAZOLAM 1 MG: 1 INJECTION INTRAMUSCULAR; INTRAVENOUS at 16:00

## 2018-01-01 RX ADMIN — SODIUM CHLORIDE, PRESERVATIVE FREE 5 ML: 5 INJECTION INTRAVENOUS at 12:24

## 2018-01-01 RX ADMIN — ENOXAPARIN SODIUM 40 MG: 40 INJECTION SUBCUTANEOUS at 18:52

## 2018-01-01 RX ADMIN — SODIUM CHLORIDE, POTASSIUM CHLORIDE, SODIUM LACTATE AND CALCIUM CHLORIDE: 600; 310; 30; 20 INJECTION, SOLUTION INTRAVENOUS at 16:45

## 2018-01-01 RX ADMIN — SENNOSIDES AND DOCUSATE SODIUM 2 TABLET: 8.6; 5 TABLET ORAL at 07:39

## 2018-01-01 RX ADMIN — LIDOCAINE HYDROCHLORIDE 100 MG: 20 INJECTION, SOLUTION INFILTRATION; PERINEURAL at 16:08

## 2018-01-01 RX ADMIN — HYDROMORPHONE HYDROCHLORIDE 0.5 MG: 1 INJECTION, SOLUTION INTRAMUSCULAR; INTRAVENOUS; SUBCUTANEOUS at 16:38

## 2018-01-01 RX ADMIN — Medication: at 19:04

## 2018-01-01 RX ADMIN — SENNOSIDES AND DOCUSATE SODIUM 2 TABLET: 8.6; 5 TABLET ORAL at 08:45

## 2018-01-01 RX ADMIN — CEFAZOLIN SODIUM 2 G: 2 INJECTION, SOLUTION INTRAVENOUS at 18:54

## 2018-01-01 RX ADMIN — KETOROLAC TROMETHAMINE 30 MG: 30 INJECTION, SOLUTION INTRAMUSCULAR at 00:15

## 2018-01-01 RX ADMIN — VECURONIUM BROMIDE 2 MG: 1 INJECTION, POWDER, LYOPHILIZED, FOR SOLUTION INTRAVENOUS at 18:15

## 2018-01-01 RX ADMIN — LEVOTHYROXINE SODIUM 125 MCG: 75 TABLET ORAL at 08:20

## 2018-01-01 RX ADMIN — SODIUM CHLORIDE, PRESERVATIVE FREE 5 ML: 5 INJECTION INTRAVENOUS at 01:27

## 2018-01-01 RX ADMIN — PROPOFOL 200 MG: 10 INJECTION, EMULSION INTRAVENOUS at 16:08

## 2018-01-01 RX ADMIN — ONDANSETRON 4 MG: 2 INJECTION INTRAMUSCULAR; INTRAVENOUS at 19:25

## 2018-01-01 RX ADMIN — LORAZEPAM 0.5 MG: 0.5 TABLET ORAL at 21:57

## 2018-01-01 RX ADMIN — SUGAMMADEX 200 MG: 100 INJECTION, SOLUTION INTRAVENOUS at 18:38

## 2018-01-01 RX ADMIN — ONDANSETRON 4 MG: 4 TABLET, ORALLY DISINTEGRATING ORAL at 18:00

## 2018-01-01 RX ADMIN — HYDROMORPHONE HYDROCHLORIDE 0.5 MG: 1 INJECTION, SOLUTION INTRAMUSCULAR; INTRAVENOUS; SUBCUTANEOUS at 19:31

## 2018-01-01 RX ADMIN — LIDOCAINE: 40 CREAM TOPICAL at 08:24

## 2018-01-01 RX ADMIN — KETOROLAC TROMETHAMINE 30 MG: 30 INJECTION, SOLUTION INTRAMUSCULAR at 19:07

## 2018-01-01 RX ADMIN — SODIUM CHLORIDE, POTASSIUM CHLORIDE, SODIUM LACTATE AND CALCIUM CHLORIDE: 600; 310; 30; 20 INJECTION, SOLUTION INTRAVENOUS at 13:28

## 2018-01-01 RX ADMIN — ENOXAPARIN SODIUM 40 MG: 40 INJECTION SUBCUTANEOUS at 13:28

## 2018-01-01 RX ADMIN — LORAZEPAM 0.5 MG: 0.5 TABLET ORAL at 13:06

## 2018-01-01 RX ADMIN — ENOXAPARIN SODIUM 40 MG: 40 INJECTION SUBCUTANEOUS at 18:56

## 2018-01-01 RX ADMIN — POTASSIUM CHLORIDE, DEXTROSE MONOHYDRATE AND SODIUM CHLORIDE: 150; 5; 450 INJECTION, SOLUTION INTRAVENOUS at 20:52

## 2018-01-01 RX ADMIN — CEFAZOLIN SODIUM 2 G: 2 INJECTION, SOLUTION INTRAVENOUS at 18:52

## 2018-01-01 RX ADMIN — MEPERIDINE HYDROCHLORIDE 12.5 MG: 25 INJECTION INTRAMUSCULAR; INTRAVENOUS; SUBCUTANEOUS at 19:34

## 2018-01-01 RX ADMIN — SENNOSIDES AND DOCUSATE SODIUM 2 TABLET: 8.6; 5 TABLET ORAL at 20:14

## 2018-01-01 RX ADMIN — FENTANYL CITRATE 25 MCG: 50 INJECTION, SOLUTION INTRAMUSCULAR; INTRAVENOUS at 13:29

## 2018-01-01 RX ADMIN — VECURONIUM BROMIDE 2 MG: 1 INJECTION, POWDER, LYOPHILIZED, FOR SOLUTION INTRAVENOUS at 17:15

## 2018-01-01 RX ADMIN — ONDANSETRON 4 MG: 2 INJECTION INTRAMUSCULAR; INTRAVENOUS at 18:32

## 2018-01-01 RX ADMIN — FENTANYL CITRATE 50 MCG: 50 INJECTION, SOLUTION INTRAMUSCULAR; INTRAVENOUS at 19:39

## 2018-01-01 RX ADMIN — SODIUM CHLORIDE, PRESERVATIVE FREE 5 ML: 5 INJECTION INTRAVENOUS at 15:08

## 2018-01-01 RX ADMIN — CEFAZOLIN SODIUM 2 G: 2 INJECTION, SOLUTION INTRAVENOUS at 16:16

## 2018-01-01 RX ADMIN — CEFAZOLIN SODIUM 2 G: 2 INJECTION, SOLUTION INTRAVENOUS at 10:11

## 2018-01-01 ASSESSMENT — ACTIVITIES OF DAILY LIVING (ADL)
ADLS_ACUITY_SCORE: 11
AMBULATION: 0-->INDEPENDENT
FALL_HISTORY_WITHIN_LAST_SIX_MONTHS: NO
ADLS_ACUITY_SCORE: 12
RETIRED_COMMUNICATION: 0-->UNDERSTANDS/COMMUNICATES WITHOUT DIFFICULTY
COGNITION: 0 - NO COGNITION ISSUES REPORTED
ADLS_ACUITY_SCORE: 12
BATHING: 0-->INDEPENDENT
ADLS_ACUITY_SCORE: 11
ADLS_ACUITY_SCORE: 11
TOILETING: 0-->INDEPENDENT
RETIRED_EATING: 0-->INDEPENDENT
ADLS_ACUITY_SCORE: 11
DRESS: 0-->INDEPENDENT
ADLS_ACUITY_SCORE: 11
SWALLOWING: 0-->SWALLOWS FOODS/LIQUIDS WITHOUT DIFFICULTY
ADLS_ACUITY_SCORE: 9
ADLS_ACUITY_SCORE: 11
TRANSFERRING: 0-->INDEPENDENT
ADLS_ACUITY_SCORE: 11

## 2018-01-01 ASSESSMENT — COPD QUESTIONNAIRES: COPD: 0

## 2018-08-03 ENCOUNTER — HOSPITAL ENCOUNTER (OUTPATIENT)
Dept: CT IMAGING | Facility: CLINIC | Age: 63
Discharge: HOME | End: 2018-08-03
Attending: FAMILY MEDICINE | Admitting: FAMILY MEDICINE
Payer: COMMERCIAL

## 2018-08-03 ENCOUNTER — OFFICE VISIT (OUTPATIENT)
Dept: FAMILY MEDICINE | Facility: CLINIC | Age: 63
End: 2018-08-03
Payer: COMMERCIAL

## 2018-08-03 ENCOUNTER — HOSPITAL ENCOUNTER (EMERGENCY)
Facility: CLINIC | Age: 63
Discharge: HOME | End: 2018-08-03
Attending: EMERGENCY MEDICINE | Admitting: EMERGENCY MEDICINE
Payer: COMMERCIAL

## 2018-08-03 VITALS
DIASTOLIC BLOOD PRESSURE: 83 MMHG | HEART RATE: 91 BPM | SYSTOLIC BLOOD PRESSURE: 151 MMHG | RESPIRATION RATE: 16 BRPM | OXYGEN SATURATION: 98 % | TEMPERATURE: 98.5 F | HEIGHT: 69 IN | WEIGHT: 262 LBS | BODY MASS INDEX: 38.8 KG/M2

## 2018-08-03 VITALS
BODY MASS INDEX: 39.56 KG/M2 | HEART RATE: 100 BPM | SYSTOLIC BLOOD PRESSURE: 176 MMHG | TEMPERATURE: 97.9 F | WEIGHT: 261 LBS | DIASTOLIC BLOOD PRESSURE: 87 MMHG | OXYGEN SATURATION: 99 % | HEIGHT: 68 IN | RESPIRATION RATE: 16 BRPM

## 2018-08-03 DIAGNOSIS — R14.0 ABDOMINAL BLOATING: ICD-10-CM

## 2018-08-03 DIAGNOSIS — R93.5 ABNORMAL ABDOMINAL CT SCAN: ICD-10-CM

## 2018-08-03 DIAGNOSIS — R10.84 ABDOMINAL PAIN, GENERALIZED: Primary | ICD-10-CM

## 2018-08-03 DIAGNOSIS — R03.0 ELEVATED BLOOD-PRESSURE READING WITHOUT DIAGNOSIS OF HYPERTENSION: ICD-10-CM

## 2018-08-03 DIAGNOSIS — R18.8 OTHER ASCITES: ICD-10-CM

## 2018-08-03 DIAGNOSIS — R10.84 ABDOMINAL PAIN, GENERALIZED: ICD-10-CM

## 2018-08-03 LAB
ABO + RH BLD: NORMAL
ABO + RH BLD: NORMAL
ALBUMIN SERPL-MCNC: 3.1 G/DL (ref 3.4–5)
ALBUMIN SERPL-MCNC: 3.4 G/DL (ref 3.4–5)
ALP SERPL-CCNC: 43 U/L (ref 40–150)
ALP SERPL-CCNC: 46 U/L (ref 40–150)
ALT SERPL W P-5'-P-CCNC: 16 U/L (ref 0–50)
ALT SERPL W P-5'-P-CCNC: 18 U/L (ref 0–50)
ANION GAP SERPL CALCULATED.3IONS-SCNC: 10 MMOL/L (ref 3–14)
ANION GAP SERPL CALCULATED.3IONS-SCNC: 9 MMOL/L (ref 3–14)
AST SERPL W P-5'-P-CCNC: 25 U/L (ref 0–45)
AST SERPL W P-5'-P-CCNC: 28 U/L (ref 0–45)
BILIRUB SERPL-MCNC: 0.5 MG/DL (ref 0.2–1.3)
BILIRUB SERPL-MCNC: 0.6 MG/DL (ref 0.2–1.3)
BLD GP AB SCN SERPL QL: NORMAL
BLOOD BANK CMNT PATIENT-IMP: NORMAL
BUN SERPL-MCNC: 7 MG/DL (ref 7–30)
BUN SERPL-MCNC: 8 MG/DL (ref 7–30)
CALCIUM SERPL-MCNC: 8.3 MG/DL (ref 8.5–10.1)
CALCIUM SERPL-MCNC: 8.4 MG/DL (ref 8.5–10.1)
CHLORIDE SERPL-SCNC: 106 MMOL/L (ref 94–109)
CHLORIDE SERPL-SCNC: 108 MMOL/L (ref 94–109)
CO2 SERPL-SCNC: 25 MMOL/L (ref 20–32)
CO2 SERPL-SCNC: 26 MMOL/L (ref 20–32)
CREAT SERPL-MCNC: 0.78 MG/DL (ref 0.52–1.04)
CREAT SERPL-MCNC: 0.84 MG/DL (ref 0.52–1.04)
ERYTHROCYTE [DISTWIDTH] IN BLOOD BY AUTOMATED COUNT: 14.1 % (ref 10–15)
GFR SERPL CREATININE-BSD FRML MDRD: 69 ML/MIN/1.7M2
GFR SERPL CREATININE-BSD FRML MDRD: 74 ML/MIN/1.7M2
GLUCOSE SERPL-MCNC: 91 MG/DL (ref 70–99)
GLUCOSE SERPL-MCNC: 93 MG/DL (ref 70–99)
HCT VFR BLD AUTO: 45.5 % (ref 35–47)
HGB BLD-MCNC: 14.6 G/DL (ref 11.7–15.7)
INR PPP: 1.02 (ref 0.86–1.14)
LIPASE SERPL-CCNC: 152 U/L (ref 73–393)
MCH RBC QN AUTO: 28 PG (ref 26.5–33)
MCHC RBC AUTO-ENTMCNC: 32.1 G/DL (ref 31.5–36.5)
MCV RBC AUTO: 87 FL (ref 78–100)
PLATELET # BLD AUTO: 286 10E9/L (ref 150–450)
POTASSIUM SERPL-SCNC: 3.9 MMOL/L (ref 3.4–5.3)
POTASSIUM SERPL-SCNC: 4.5 MMOL/L (ref 3.4–5.3)
PROT SERPL-MCNC: 6.3 G/DL (ref 6.8–8.8)
PROT SERPL-MCNC: 6.8 G/DL (ref 6.8–8.8)
RBC # BLD AUTO: 5.21 10E12/L (ref 3.8–5.2)
SODIUM SERPL-SCNC: 141 MMOL/L (ref 133–144)
SODIUM SERPL-SCNC: 143 MMOL/L (ref 133–144)
SPECIMEN EXP DATE BLD: NORMAL
WBC # BLD AUTO: 7.3 10E9/L (ref 4–11)

## 2018-08-03 PROCEDURE — 99285 EMERGENCY DEPT VISIT HI MDM: CPT | Mod: 25

## 2018-08-03 PROCEDURE — 85610 PROTHROMBIN TIME: CPT | Performed by: EMERGENCY MEDICINE

## 2018-08-03 PROCEDURE — 36415 COLL VENOUS BLD VENIPUNCTURE: CPT | Performed by: FAMILY MEDICINE

## 2018-08-03 PROCEDURE — 80053 COMPREHEN METABOLIC PANEL: CPT | Performed by: EMERGENCY MEDICINE

## 2018-08-03 PROCEDURE — 49083 ABD PARACENTESIS W/IMAGING: CPT

## 2018-08-03 PROCEDURE — 85027 COMPLETE CBC AUTOMATED: CPT | Performed by: FAMILY MEDICINE

## 2018-08-03 PROCEDURE — 99203 OFFICE O/P NEW LOW 30 MIN: CPT | Performed by: FAMILY MEDICINE

## 2018-08-03 PROCEDURE — 83690 ASSAY OF LIPASE: CPT | Performed by: EMERGENCY MEDICINE

## 2018-08-03 PROCEDURE — 25000125 ZZHC RX 250: Performed by: FAMILY MEDICINE

## 2018-08-03 PROCEDURE — 86901 BLOOD TYPING SEROLOGIC RH(D): CPT | Performed by: EMERGENCY MEDICINE

## 2018-08-03 PROCEDURE — 74177 CT ABD & PELVIS W/CONTRAST: CPT

## 2018-08-03 PROCEDURE — 86304 IMMUNOASSAY TUMOR CA 125: CPT | Performed by: EMERGENCY MEDICINE

## 2018-08-03 PROCEDURE — 80053 COMPREHEN METABOLIC PANEL: CPT | Performed by: FAMILY MEDICINE

## 2018-08-03 PROCEDURE — 25000128 H RX IP 250 OP 636: Performed by: FAMILY MEDICINE

## 2018-08-03 PROCEDURE — 86900 BLOOD TYPING SEROLOGIC ABO: CPT | Performed by: EMERGENCY MEDICINE

## 2018-08-03 PROCEDURE — 86850 RBC ANTIBODY SCREEN: CPT | Performed by: EMERGENCY MEDICINE

## 2018-08-03 RX ORDER — LEVOTHYROXINE SODIUM 125 UG/1
CAPSULE ORAL
Qty: 30 CAPSULE | COMMUNITY
Start: 2018-08-03 | End: 2018-01-01

## 2018-08-03 RX ORDER — ONDANSETRON 4 MG/1
4 TABLET, ORALLY DISINTEGRATING ORAL EVERY 8 HOURS PRN
Qty: 10 TABLET | Refills: 0 | Status: SHIPPED | OUTPATIENT
Start: 2018-08-03 | End: 2018-08-06

## 2018-08-03 RX ORDER — HYDROCODONE BITARTRATE AND ACETAMINOPHEN 5; 325 MG/1; MG/1
1 TABLET ORAL EVERY 4 HOURS PRN
Qty: 15 TABLET | Refills: 0 | Status: ON HOLD | OUTPATIENT
Start: 2018-08-03 | End: 2018-01-01

## 2018-08-03 RX ORDER — IOPAMIDOL 755 MG/ML
129 INJECTION, SOLUTION INTRAVASCULAR ONCE
Status: COMPLETED | OUTPATIENT
Start: 2018-08-03 | End: 2018-08-03

## 2018-08-03 RX ORDER — ZINC OXIDE 13 %
CREAM (GRAM) TOPICAL
Status: ON HOLD | COMMUNITY
Start: 2018-08-03 | End: 2018-01-01

## 2018-08-03 RX ADMIN — IOPAMIDOL 129 ML: 755 INJECTION, SOLUTION INTRAVENOUS at 14:41

## 2018-08-03 RX ADMIN — SODIUM CHLORIDE, PRESERVATIVE FREE 77 ML: 5 INJECTION INTRAVENOUS at 14:42

## 2018-08-03 ASSESSMENT — ENCOUNTER SYMPTOMS
SHORTNESS OF BREATH: 0
FATIGUE: 0
VOMITING: 0
CONSTIPATION: 0
ABDOMINAL PAIN: 1
DYSURIA: 0
FREQUENCY: 0
CHILLS: 0
FEVER: 0
APPETITE CHANGE: 1
ABDOMINAL DISTENTION: 1
DIARRHEA: 0
UNEXPECTED WEIGHT CHANGE: 1
NAUSEA: 1

## 2018-08-03 NOTE — MR AVS SNAPSHOT
After Visit Summary   8/3/2018    Carie Vaughn    MRN: [id number]           Patient Information     Date Of Birth          1955        Visit Information        Provider Department      8/3/2018 8:40 AM Janak Almeida MD [facility]        Today's Diagnoses     Elevated blood-pressure reading without diagnosis of hypertension    -  1    Abdominal pain, generalized          Care Instructions      Small Bowel Obstruction     Small bowel obstruction can lead to tissue damage and even tissue death.   A small bowel obstruction occurs when part or all of the small intestine (bowel) is blocked. As a result, digestive contents can t move through the bowel properly and out of the body. Treatment is needed right away to remove the blockage. This can ease painful symptoms. It can also prevent serious problems, such as tissue death or bursting (rupture) of the small bowel. Without treatment, a small bowel obstruction can be fatal.  Causes of small bowel obstruction  A small bowel obstruction can be caused by:    Scar tissue (adhesions). These may form after belly (abdominal) surgery or an infection.    Hernia. A hernia is when an organ pushes through a weak spot or tear in the abdomen wall. Part of the small bowel can push out and be seen as a bulge under the belly. Hernias can also occur internally.    Certain health problems. These include when part of the bowel slides inside another part (intussusception). Other causes include irritable bowel disease such as Crohn s disease, and inflammation and sores in the intestine (ulcerative colitis).    Abnormal tissue growths (tumors). These can form on the inside or outside of the small bowel. They are usually due to cancer.  Symptoms of small bowel obstruction  Common symptoms include:    Belly cramping and pain    Belly swelling and bloating    Upset stomach (nausea) and vomiting    Can't  pass gas    Can't pass stool  (constipation)    Diarrhea  Diagnosing small bowel obstruction  Your provider will ask about your symptoms and health history. You ll also have a physical exam. Tests may also be done to confirm the problem. These can include:    Imaging tests. These provide pictures of the small bowel. Common tests include X-rays and a CT scan.    Blood tests. These check for infection and other problems, such as excess fluid loss (dehydration).    Upper GI (gastrointestinal) series with a small bowel follow-through. This test takes X-rays of the upper digestive tract from the mouth through the small bowel. An X-ray dye (contrast fluid) is used. The dye coats the inside of your upper digestive tract so it will show up clearly on X-rays.  Treating small bowel obstruction  Treatment takes place in a hospital. As part of your care, the following may be done:    No food or drink is given by mouth. This allows your bowels to rest.    An IV (intravenous) line is placed in a vein in your arm or hand. The IV line is used to give fluids. It may also be used to give medicines. These may be needed to ease pain, nausea, and other symptoms. They may also be needed to treat or prevent infections.    A soft, thin, flexible tube (nasogastric tube) is inserted through your nose and into your stomach. The tube is used to remove extra gas and fluid in your stomach and bowels. This helps to ease symptoms such as pain and swelling.    In severe cases, surgery is done. This may be needed if the small bowel is almost or totally blocked, or there is a hole in the bowel (bowel perforation). During surgery, the blockage is removed. Parts of the bowel may also be removed if there is tissue death. Other repair may be done as well, depending on what caused the blockage. Your healthcare provider will give you more information about surgery, if needed.    You ll be watched closely in the hospital until your symptoms improve. Your provider will tell you when you  can go home.  Long-term concerns   After treatment, most people recover with no lasting effects. If a long part of the bowel is removed, there is a greater chance for lifelong digestive problems. Bowel movements may become irregular. Work with your provider to learn the best ways to manage any symptoms you may have, and to protect your health.  When to call your healthcare provider  Call your provider right away if you have any of the following:    Severe pain (call 911)    Belly swelling or cramping that won t go away    Can t pass stool or gas    Nausea or vomiting (especially if the vomit looks or smells like stool)   Date Last Reviewed: 7/1/2016 2000-2017 [employer]. [address] [place] [address], [place] [address]. All rights reserved. This information is not intended as a substitute for professional medical care. Always follow your healthcare professional's instructions.                Follow-ups after your visit        Your next 10 appointments already scheduled     Aug 15, 2018  1:30 PM CDT   Office Visit with Jessie Olguin DO   [facility] ([facility]    [address] [place] [id number] [phone]           Bring a current list of meds and any records pertaining to this visit. For Physicals, please bring immunization records and any forms needing to be filled out. Please arrive 10 minutes early to complete paperwork.              Future tests that were ordered for you today     Open Future Orders        Priority Expected Expires Ordered    CT Abdomen Pelvis w Contrast STAT  8/3/2019 8/3/2018            Who to contact     If you have questions or need follow up information about today's clinic visit or your schedule please contact [facility] directly at [phone].  Normal or non-critical lab and imaging results will be communicated to you by MyChart, letter or phone within 4 business days after the clinic has received the results. If  "you do not hear from us within 7 days, please contact the clinic through [employer] or phone. If you have a critical or abnormal lab result, we will notify you by phone as soon as possible.  Submit refill requests through [employer] or call your pharmacy and they will forward the refill request to us. Please allow 3 business days for your refill to be completed.          Additional Information About Your Visit        [employer] Information     [employer] lets you send messages to your doctor, view your test results, renew your prescriptions, schedule appointments and more. To sign up, go to [email or web address] . Click on \"Log in\" on the left side of the screen, which will take you to the Welcome page. Then click on \"Sign up Now\" on the right side of the page.     You will be asked to enter the access code listed below, as well as some personal information. Please follow the directions to create your username and password.     Your access code is: [email or web address]  Expires: 2018  9:10 AM     Your access code will  in 90 days. If you need help or a new code, please call your [place] clinic or [phone].        Care EveryWhere ID     This is your Care EveryWhere ID. This could be used by other organizations to access your [place] medical records  [id number]        Your Vitals Were     Pulse Temperature Respirations Height Pulse Oximetry BMI (Body Mass Index)    91 98.5  F (36.9  C) (Oral) 16 5' 8.5\" (1.74 m) 98% 39.26 kg/m2       Blood Pressure from Last 3 Encounters:   18 151/83   06 124/70    Weight from Last 3 Encounters:   18 262 lb (118.8 kg)   06 225 lb (102.1 kg)              We Performed the Following     CBC with platelets     Comprehensive metabolic panel        Primary Care Provider Office Phone # Fax #    Jessie Olguin [phone] [address] [address]        Equal Access to Services     LEEROY ARMSTRONG AH: Destiny morrison " Chanell, daisyda lulishaadaha, chuckieta kaeileen sarmiento, hernandez diptiin hayaan edumando griceldakip laBisibucky gisela. So [facility] [phone].    ATENCIÓN: Si apolonia mix, tiene a sifuentes disposición servicios gratuitos de asistencia lingüística. Ximena al [phone].    We comply with applicable federal civil rights laws and [place] laws. We do not discriminate on the basis of race, color, national origin, age, disability, sex, sexual orientation, or gender identity.            Thank you!     Thank you for choosing [facility]  for your care. Our goal is always to provide you with excellent care. Hearing back from our patients is one way we can continue to improve our services. Please take a few minutes to complete the written survey that you may receive in the mail after your visit with us. Thank you!             Your Updated Medication List - Protect others around you: Learn how to safely use, store and throw away your medicines at www.disposemymeds.org.          This list is accurate as of 8/3/18  9:10 AM.  Always use your most recent med list.                   Brand Name Dispense Instructions for use Diagnosis    COD LIVER 4000-400 UNIT/5ML Oil      None Entered        Levothyroxine Sodium 125 MCG Caps     30 capsule         MACROBID 100 MG capsule   Generic drug:  nitroFURantoin (macrocrystal-monohydrate)     14    1 CAPSULE EVERY 12 HOURS WITH FOOD    Acute cystitis       PROBIOTIC DAILY Caps           VITAMIN C PO      1 TABLET 3 TIMES DAILY

## 2018-08-03 NOTE — PROGRESS NOTES
Results discussed with the patient directly. As noted, results concerning for malignancy. Will start with peritoneal fluid analysis. Will arrange for IR guided biopsy, establishing care with oncology and tumor markers for ovarian cancer next week.     Janak Almeida MD

## 2018-08-03 NOTE — PROGRESS NOTES
"  SUBJECTIVE:   Carie Vaughn is a 63 year old female who presents to clinic today for the following health issues:    Constipation and abdominal concerns    Duration: off and on for 2 months    Description:       Frequency of bowel movements:        Consistency of stool: small and soft    Intensity:  moderate    Accompanying signs and symptoms:        Abdominal pain: YES       Rectal pain: no        Blood in stool: no        Nausea/vomitting: YES    History:        Similar problems in past: no     Precipitating or alleviating factors:        Medications worsening symptoms: no     Therapies tried and outcome: None       Chronic laxative use: YES- probiotics  62 yo who presents to clinic for evaluation of abdominal distension and changes in her stool. The patient reports that symptoms have been present for the past 2-3 months. She has been seeing a chiropractor which started her on magnesium supplement but it has not been helpful. She is noticing that the stool are thinner than normal. Also noticed progressive abdominal distension \"I feel like I'm 9 months pregnant.\"  She denies noticing any blood in her urine or blood in her stool.  Denies noticing any rashes or changes in her skin color.  Also noticed that she gained 15 pounds in the last 2 months.  The patient has not had a colonoscopy in the past.  Family history is negative for gastrointestinal malignancies.  3 years ago, she had an episode of postmenopausal vaginal bleeding and underwent an endometrial biopsy and ultrasound.  She was informed that she had uterine fibroids at that time the results of the endometrial biopsy were negative (per patient report).  The patient has not attempted any over-the-counter medications to alleviate the bloating.      Surgical history significant for lumpectomy.  Patient is not up-to-date with routine screening exams.    Problem list and histories reviewed & adjusted, as indicated.  Additional history: as " "documented    Patient Active Problem List   Diagnosis   (none) - all problems resolved or deleted     No past surgical history on file.    Social History   Substance Use Topics     Smoking status: Never Smoker     Smokeless tobacco: Never Used     Alcohol use No     No family history on file.        Reviewed and updated as needed this visit by clinical staff  Tobacco  Meds  Soc Hx      Reviewed and updated as needed this visit by Provider       ROS:  Constitutional, HEENT, cardiovascular, pulmonary, gi and gu systems are negative, except as otherwise noted.    OBJECTIVE:     /83  Pulse 91  Temp 98.5  F (36.9  C) (Oral)  Resp 16  Ht 5' 8.5\" (1.74 m)  Wt 262 lb (118.8 kg)  SpO2 98%  BMI 39.26 kg/m2  Body mass index is 39.26 kg/(m^2).  GENERAL: healthy, alert and no distress  EYES: Eyes grossly normal to inspection, PERRL and conjunctivae and sclerae normal  NECK: no adenopathy, no asymmetry, masses, or scars and thyroid normal to palpation  RESP: lungs clear to auscultation - no rales, rhonchi or wheezes  CV: regular rate and rhythm, normal S1 S2, no S3 or S4, no murmur, click or rub, no peripheral edema and peripheral pulses strong  ABDOMEN: soft, nontender, no hepatosplenomegaly, generalized abdominal distention.  Faint bowel sounds present.  I was unable to palpate for abdominal passes due to the distention.  PSYCH: mentation appears normal, affect normal/bright    Diagnostic Test Results:  Results for orders placed or performed in visit on 08/03/18 (from the past 24 hour(s))   CBC with platelets   Result Value Ref Range    WBC 7.3 4.0 - 11.0 10e9/L    RBC Count 5.21 (H) 3.8 - 5.2 10e12/L    Hemoglobin 14.6 11.7 - 15.7 g/dL    Hematocrit 45.5 35.0 - 47.0 %    MCV 87 78 - 100 fl    MCH 28.0 26.5 - 33.0 pg    MCHC 32.1 31.5 - 36.5 g/dL    RDW 14.1 10.0 - 15.0 %    Platelet Count 286 150 - 450 10e9/L       ASSESSMENT/PLAN:   1. Abdominal pain, generalized  Assessment: The patient presents to clinic for " evaluation of generalized abdominal concerns.  Abdomen was noted to be slightly distended today.  I was not able to feel for any masses today.  Her concerns regarding the changes in her stool in addition to the distention is concerning for an intra-abdominal process.  Differential diagnoses are broad, constipation, gastrointestinal mass, pelvic mass, hepatic or pancreatic abnormality. Will start with routine labs as well as CT of the abdomen and pelvis.  If the CT of the abdomen is unremarkable I would recommend proceeding with MiraLAX to initiate a bowel movement.  I also emphasized on the importance of proceeding with a Screening colonoscopy.   The patient has not had a general wellness exam for many years.  She is not up-to-date with routine cancer screening studies such as colonoscopy, mammogram and Pap smears.  I emphasized on the importance of the physical scheduled within the next 2 weeks with her primary care provider.  Plan:  - Comprehensive metabolic panel  - CBC with platelets  - CT Abdomen Pelvis w Contrast; Future    2. Elevated blood-pressure reading without diagnosis of hypertension  The patient was noted to have an elevated blood pressure today.  We will recheck her blood pressure during her upcoming physical.  She does have a blood pressure monitor at home.  Patient was advised to monitor her blood pressure at home as well.      FOLLOW-UP: Differential diagnoses, diagnostic work-up and treatment options were discussed with the patient. The patient was advised to return to clinic or present to the emergency room if symptoms persisted or fails to improve despite current medication(s)/plan. Patient may return to clinic earlier if symptoms persisted or fails to improve.    The patient verbalized understanding.     Janak Almeida MD  [facility]  PAGER: [phone] or (W): [phone]      Janak Almeida MD  [facility]

## 2018-08-03 NOTE — ED PROVIDER NOTES
Emergency Department Attending Supervision Note  8/3/2018  4:18 PM      I evaluated this patient in conjunction with Carolynn Roper PA    Briefly, the patient presented with abdominal pain.  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  I was called by the physician who referred the patient to the ED late on a Friday afternoon with intention for diagnostic and therapeutic paracentesis.  Patient denies fevers or any vomiting today.  No history of liver disease, prior cancer.  She has 2 siblings with history of non-Hodgkin's lymphoma.    On my exam, she appears nontoxic and is sitting upright in room 29 with her  at bedside.  Her abdomen is protuberant without palpable fluid wave though exam is limited by habitus.  No guarding.  Bowel sounds are present.  I reviewed the CT read and showed the patient images from the CT in her room.    Results:  Laboratory:  CMP: Calcium 8.3 (L), Albumin 3.1 (L), Protein Total 6.3 (L), o/w WNL (Creatinine 0.78)  Lipase: 152  INR: 1.02  ABO/Rh Type and Screen: O Positive, Antibody Negative    : in process    ED course:  Past medical records, nursing notes, and vitals reviewed.  1645: I performed an exam of the patient and obtained history, as documented above.   IV inserted and blood drawn.    My impression is that her progressive abdominal swelling can be explained by the CT findings today, which are very worrisome for a new diagnosis of neoplasm though this is unconfirmed.  This possibility was discussed with the patient as well as the need for close outpatient follow-up for paracentesis in the near term.  Because this procedure was not available through our radiology team here today, we have made an appointment  "for her next Tuesday morning and relayed the specifics of that appointment to her.  She is very comfortable with this plan.  Nausea medicine was prescribed.  Basic screening laboratory studies are benign.  She was advised that she will need to follow-up closely through primary care physician with likely need for further referral to specialists depending on the results of additional tests.  Return precautions reviewed for high fevers, unbearable pain, prolonged vomiting, or any other acute concerns.    Diagnosis    ICD-10-CM    1. Other ascites R18.8 ABO/Rh type and screen   2. Abdominal bloating R14.0 US Paracentesis   3. Abnormal abdominal CT scan R93.5 US Paracentesis     This record was created at least in part using electronic voice recognition software, so please excuse any \"typos.\"    Kamron Joy MD Reitsema, Jeffrey Alan, MD  08/03/18 5625    "

## 2018-08-03 NOTE — PATIENT INSTRUCTIONS
Small Bowel Obstruction     Small bowel obstruction can lead to tissue damage and even tissue death.   A small bowel obstruction occurs when part or all of the small intestine (bowel) is blocked. As a result, digestive contents can t move through the bowel properly and out of the body. Treatment is needed right away to remove the blockage. This can ease painful symptoms. It can also prevent serious problems, such as tissue death or bursting (rupture) of the small bowel. Without treatment, a small bowel obstruction can be fatal.  Causes of small bowel obstruction  A small bowel obstruction can be caused by:    Scar tissue (adhesions). These may form after belly (abdominal) surgery or an infection.    Hernia. A hernia is when an organ pushes through a weak spot or tear in the abdomen wall. Part of the small bowel can push out and be seen as a bulge under the belly. Hernias can also occur internally.    Certain health problems. These include when part of the bowel slides inside another part (intussusception). Other causes include irritable bowel disease such as Crohn s disease, and inflammation and sores in the intestine (ulcerative colitis).    Abnormal tissue growths (tumors). These can form on the inside or outside of the small bowel. They are usually due to cancer.  Symptoms of small bowel obstruction  Common symptoms include:    Belly cramping and pain    Belly swelling and bloating    Upset stomach (nausea) and vomiting    Can't  pass gas    Can't pass stool (constipation)    Diarrhea  Diagnosing small bowel obstruction  Your provider will ask about your symptoms and health history. You ll also have a physical exam. Tests may also be done to confirm the problem. These can include:    Imaging tests. These provide pictures of the small bowel. Common tests include X-rays and a CT scan.    Blood tests. These check for infection and other problems, such as excess fluid loss (dehydration).    Upper GI  (gastrointestinal) series with a small bowel follow-through. This test takes X-rays of the upper digestive tract from the mouth through the small bowel. An X-ray dye (contrast fluid) is used. The dye coats the inside of your upper digestive tract so it will show up clearly on X-rays.  Treating small bowel obstruction  Treatment takes place in a hospital. As part of your care, the following may be done:    No food or drink is given by mouth. This allows your bowels to rest.    An IV (intravenous) line is placed in a vein in your arm or hand. The IV line is used to give fluids. It may also be used to give medicines. These may be needed to ease pain, nausea, and other symptoms. They may also be needed to treat or prevent infections.    A soft, thin, flexible tube (nasogastric tube) is inserted through your nose and into your stomach. The tube is used to remove extra gas and fluid in your stomach and bowels. This helps to ease symptoms such as pain and swelling.    In severe cases, surgery is done. This may be needed if the small bowel is almost or totally blocked, or there is a hole in the bowel (bowel perforation). During surgery, the blockage is removed. Parts of the bowel may also be removed if there is tissue death. Other repair may be done as well, depending on what caused the blockage. Your healthcare provider will give you more information about surgery, if needed.    You ll be watched closely in the hospital until your symptoms improve. Your provider will tell you when you can go home.  Long-term concerns   After treatment, most people recover with no lasting effects. If a long part of the bowel is removed, there is a greater chance for lifelong digestive problems. Bowel movements may become irregular. Work with your provider to learn the best ways to manage any symptoms you may have, and to protect your health.  When to call your healthcare provider  Call your provider right away if you have any of the  following:    Severe pain (call 911)    Belly swelling or cramping that won t go away    Can t pass stool or gas    Nausea or vomiting (especially if the vomit looks or smells like stool)   Date Last Reviewed: 7/1/2016 2000-2017 [employer]. [address] [place] [address]. All rights reserved. This information is not intended as a substitute for professional medical care. Always follow your healthcare professional's instructions.

## 2018-08-03 NOTE — NURSING NOTE
"Chief Complaint   Patient presents with     Bowel Problems     initial /88 (BP Location: Left arm, Cuff Size: Adult Large)  Pulse 91  Temp 98.5  F (36.9  C) (Oral)  Resp 16  Ht 5' 8.5\" (1.74 m)  Wt 262 lb (118.8 kg)  SpO2 98%  BMI 39.26 kg/m2 Estimated body mass index is 39.26 kg/(m^2) as calculated from the following:    Height as of this encounter: 5' 8.5\" (1.74 m).    Weight as of this encounter: 262 lb (118.8 kg).  BP completed using cuff size: large.  L  arm      Health Maintenance that is potentially due pending provider review:  New pt. Aware she is due for mammo,pap, and colonoscopy    n/a    Sree Penn ma    "

## 2018-08-03 NOTE — ED PROVIDER NOTES
"  History     Chief Complaint:  Abdominal Pain    HPI   Carie Vaughn is a 63 year old female who presents with abdominal discomfort.  The patient notes that for the past 2 months she has had increase in upper abdominal bloating and bowel movement changes.  She has tried taking probiotics and magnesium at home without relief of symptoms.  Her symptoms have worsened over the past week and she notes that she feels that her \"stomach looks like I am pregnant.\"  There is also been associated nausea and decreased appetite.  Patient has never experienced similar symptoms in the past.  She is in the process of switching primary care providers but presented to clinic today for evaluation of her symptoms.  Due to initial concern of possible intestinal blockage, CT scan was completed outpatient.  Results are as seen below.  The patient was then called by her primary care provider with the results who suggested that she present to the ED for further evaluation and paracentesis due to abdominal fluid seen on CT scan.  This prompted the patient's presentation with her  to the ED.  Here she notes that she has the same abdominal bloating sensation.  She notes that her mother has a history of breast cancer and she has 2 sisters with non-Hodgkin's lymphoma.  Her past medical history is noted for thyroid goiter and subsequent surgical removal.  She is on levothyroxine for thyroid hormone supplementation but otherwise takes no other daily medications.  Reports weight gain over the past few months, denies night sweats. Denies any current nausea or vomiting.     CT Abdomen/Pelvis 8/3/2018  1. Moderate peritoneal nodularity, most prominent in the greater  omentum, and a moderate amount of free intraperitoneal fluid. 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 The ovaries  are not well visualized, but a primary ovarian neoplasm is a possible  cause of the aforementioned findings.  2. No other findings " "suspicious for neoplasm in the abdomen or pelvis.  Per radiology report     Allergies:  No known drug allergies    Medications:    Levothyroxine sodium  Probiotic product    Past Medical History:    The patient does not have any past pertinent medical history.    Past Surgical History:    Breast biopsy  Thyroid removal     Family History:    History reviewed. No pertinent family history.     Social History:  Marital Status:   [2]  Smoking status: Never smoker  Alcohol use: No     Review of Systems   Constitutional: Positive for appetite change and unexpected weight change (Weight gain ). Negative for chills, fatigue and fever.   Respiratory: Negative for shortness of breath.    Cardiovascular: Negative for chest pain.   Gastrointestinal: Positive for abdominal distention, abdominal pain and nausea. Negative for constipation, diarrhea and vomiting.   Genitourinary: Negative for dysuria, frequency and urgency.   Skin: Negative for rash.   All other systems reviewed and are negative.    Physical Exam     Patient Vitals for the past 24 hrs:   BP Temp Temp src Pulse Heart Rate Resp SpO2 Height Weight   08/03/18 1727 - - - - 95 16 99 % - -   08/03/18 1525 176/87 97.9  F (36.6  C) Oral 100 - 20 99 % 1.727 m (5' 8\") 118.4 kg (261 lb)     Physical Exam  General: Well appearing, well nourished. Normal mood and affect. Obese habitus.  Skin: Intermittent bruising throughout the abdomen (patient notes this is from rubbing her abdomen from the discomfort).   HEENT: Head: Normocephalic, atraumatic.  Eyes: Conjunctiva clear, sclera non-icteric, EOM intact, PERRL.   Throat/pharynx: Mucous membranes moist, no mucosal lesions. Mucosa non-inflamed, no tonsillar hypertrophy or exudate.   Neck: Supple, without lesions, bruits, or adenopathy, thyroid non-enlarged and non-tender.  Cardiac: Normal rate and regular rhythm, no murmur or gallop.   Lungs: Clear to auscultation   Abdomen: Mild upper abdominal distention. No fluid wave " noted. Bowel sounds normal, no tenderness. No guarding or rebound tenderness.   Musculoskeletal: Normal gait and station. Moving all four extremities spontaneously.   Neurologic: Oriented x 3.   Psychiatric: Intact recent and remote memory, judgment and insight, normal mood and affect.     Emergency Department Course   Laboratory:  CMP: Calcium 8.3 (L), Albumin 3.1 (L), Protein Total 6.3 (L), o/w WNL (Creatinine 0.78)  Lipase: 152  INR: 1.02  ABO/Rh Type and Screen: O Positive, Antibody Negative     : in process    Emergency Department Course:  Past medical records, nursing notes, and vitals reviewed.  I performed an exam of the patient and obtained history, as documented above.     1650 I reevaluated the patient and updated her as to the results of her workup and the appointment for paracentesis next week.     Findings and plan explained to the patient. Patient discharged home with instructions regarding supportive care, medications, and reasons to return. The importance of close follow-up was reviewed.     Impression & Plan    Medical Decision Making:  Carie Vaughn is a 63-year-old female who presented the ED today for evaluation of abdominal discomfort and CT imaging results.  Details of patient's history can be noted in the HPI.  Upon my evaluation, the patient was well-appearing.  There was mild abdominal bloating but no fluid wave seen.  She is vitally stable and expressed no additional symptoms such as nausea or vomiting at this time.  CT scan findings were reviewed with the patient and her .  Imaging results indicated a moderate amount of free intraperitoneal fluid and possible ovarian neoplasm.  CBC had been completed by her primary care provider but additional laboratory analysis was completed here, including CMP, INR, blood type and screen, lipase, . No acute abnormalities were seen,  pending.  I initially called the radiology staff to see if diagnostic and therapeutic  paracentesis could be completed this afternoon.  This was unable to be completed as daytime staff had already left. As the patient was well appearing and vitally stable,  I scheduled the patient a therapeutic/diagnostic paracentesis for this upcoming Tuesday, August 7, at 8:30 AM.  This is the earliest available appointment at [facility]. This plan was discussed with the patient and her  who felt comfortable with home management of symptoms until her scheduled appointment.     The laboratory findings as above were discussed. They felt comfortable with discharge and follow-up appointment for paracentesis next week.  They will also call her primary care provider and subsequently follow-up the following day for review of results and further treatment plan.  Patient was prescribed Zofran to control nausea.  She was told to use Tylenol or ibuprofen as needed for symptom control but she is also prescribed Norco as needed for uncontrolled pain or to help with sleep at night.  I advised the patient to return to the ED for uncontrolled abdominal pain, uncontrolled nausea or vomiting, fever greater than 101, or new concerns.  All questions were answered prior the patient's discharge.  She was in agreement with the plan stated above.    Diagnosis:    ICD-10-CM   1. Other ascites R18.8   2. Abdominal bloating R14.0   3. Abnormal abdominal CT scan R93.5       Disposition:  discharged to home    Discharge Medications:   Details   HYDROcodone-acetaminophen (NORCO) 5-325 MG per tablet Take 1 tablet by mouth every 4 hours as needed for pain, Disp-15 tablet, R-0, Local Print      ondansetron (ZOFRAN ODT) 4 MG ODT tab Take 1 tablet (4 mg) by mouth every 8 hours as needed for nausea, Disp-10 tablet, R-0, Local Print       [employer]    8/3/2018    EMERGENCY DEPARTMENT       Carolynn Roper PA  08/03/18 1840

## 2018-08-03 NOTE — ED AVS SNAPSHOT
Emergency Department    [address] [facility] [address]    Phone:  [phone]    Fax:  [phone]                                       Carie Vaughn   MRN: [id number]    Department:   Emergency Department   Date of Visit:  8/3/2018           Patient Information     Date Of Birth          1955        Your diagnoses for this visit were:     Other ascites     Abdominal bloating     Abnormal abdominal CT scan        You were seen by Kamron Joy MD.      Follow-up Information     Go to  Emergency Department.    Specialty:  EMERGENCY MEDICINE    Why:  uncontrolled pain, uncontrolled nausea or vomiting, fevers >101, or new concerns    Contact information:    [address] [facility] [address] [phone]        Go in 4 days to follow up.    Why:  For paracentesis. Present for your appointment by 7am    Contact information:    [place] for paracentesis         Follow up with Jessie Olguin DO. Go in 5 days.    Specialty:  Family Practice    Why:  Follow-up with your PCP after your paracentesis     Contact information:    [address]  [facility] [id number] [phone]          Discharge Instructions       Take the zofran as needed for nausea control. Use ibuprofen as needed for pain control. If this does not control your pain, use the norco as prescribed. Return to the ED for uncontrolled pain, uncontrolled nausea or vomiting, fevers >101, or new concerns. Your paracentesis appointment is scheduled for 830 at [facility] on Tuesday, Aug 7th. You need to present by 7am for this appointment. Then follow-up with your primary care provider on Wed for results and further treatment plan.       Discharge References/Attachments     ASCITES (ENGLISH)    PARACENTESIS (ENGLISH)      Your next 10 appointments already scheduled     Aug 07, 2018  8:30 AM CDT   US PARACENTESIS with US4,  IMAGING NURSE,  BODY RAD   [facility]  ([facility])    [address] [facility] [address] [phone]           Bring a list of your medicines to the exam. Include vitamins, minerals and over-the-counter drugs.  Tell your doctor in advance:   If you are or may be pregnant.   If you are taking Coumadin (or any other blood thinners) 5 days prior to the exam for any special instructions.   If you are diabetic to determine if your insulin needs have to be adjusted for the exam.  IF YOUR DOCTOR ALSO PRESCRIBED SEDATION DURING THE EXAM (medicine to help you relax): You will receive separate instructions about driving, eating, and additional tests that may be necessary prior to your exam day.  Please call the Imaging Department at your exam site with any questions.            Aug 15, 2018  1:30 PM CDT   Office Visit with Jessie Olguin,    [facility] ([facility])    [address] [address] [phone]           Bring a current list of meds and any records pertaining to this visit. For Physicals, please bring immunization records and any forms needing to be filled out. Please arrive 10 minutes early to complete paperwork.              24 Hour Appointment Hotline       To make an appointment at any [facility], call [phone] ([phone]). If you don't have a family doctor or clinic, we will help you find one. [facility] clinics are conveniently located to serve the needs of you and your family.          ED Discharge Orders     US Paracentesis                    Review of your medicines      START taking        Dose / Directions Last dose taken    HYDROcodone-acetaminophen 5-325 MG per tablet   Commonly known as:  NORCO   Dose:  1 tablet   Quantity:  15 tablet        Take 1 tablet by mouth every 4 hours as needed for pain   Refills:  0        ondansetron 4 MG ODT tab   Commonly known as:  ZOFRAN ODT   Dose:  4 mg   Quantity:  10 tablet        Take 1 tablet (4 mg) by  mouth every 8 hours as needed for nausea   Refills:  0          Our records show that you are taking the medicines listed below. If these are incorrect, please call your family doctor or clinic.        Dose / Directions Last dose taken    COD LIVER 4000-400 UNIT/5ML Oil        None Entered   Refills:  0        Levothyroxine Sodium 125 MCG Caps   Quantity:  30 capsule        Refills:  0        MACROBID 100 MG capsule   Quantity:  14   Generic drug:  nitroFURantoin (macrocrystal-monohydrate)        1 CAPSULE EVERY 12 HOURS WITH FOOD   Refills:  0        PROBIOTIC DAILY Caps        Refills:  0        VITAMIN C PO        1 TABLET 3 TIMES DAILY   Refills:  0                Information about OPIOIDS     PRESCRIPTION OPIOIDS: WHAT YOU NEED TO KNOW   We gave you an opioid (narcotic) pain medicine. It is important to manage your pain, but opioids are not always the best choice. You should first try all the other options your care team gave you. Take this medicine for as short a time (and as few doses) as possible.     These medicines have risks:    DO NOT drive when on new or higher doses of pain medicine. These medicines can affect your alertness and reaction times, and you could be arrested for driving under the influence (DUI). If you need to use opioids long-term, talk to your care team about driving.    DO NOT operate heave machinery    DO NOT do any other dangerous activities while taking these medicines.     DO NOT drink any alcohol while taking these medicines.      If the opioid prescribed includes acetaminophen, DO NOT take with any other medicines that contain acetaminophen. Read all labels carefully. Look for the word  acetaminophen  or  Tylenol.  Ask your pharmacist if you have questions or are unsure.    You can get addicted to pain medicines, especially if you have a history of addiction (chemical, alcohol or substance dependence). Talk to your care team about ways to reduce this risk.    Store your pills in a  secure place, locked if possible. We will not replace any lost or stolen medicine. If you don t finish your medicine, please throw away (dispose) as directed by your pharmacist. The [place] Pollution Control [employer] has more information about safe disposal: https://www.pca.state.[place]/living-green/managing-unwanted-medications.     All opioids tend to cause constipation. Drink plenty of water and eat foods that have a lot of fiber, such as fruits, vegetables, prune juice, apple juice and high-fiber cereal. Take a laxative (Miralax, milk of magnesia, Colace, Senna) if you don t move your bowels at least every other day.         Prescriptions were sent or printed at these locations (2 Prescriptions)                   Other Prescriptions                Printed at Department/Unit printer (2 of 2)         HYDROcodone-acetaminophen (NORCO) 5-325 MG per tablet               ondansetron (ZOFRAN ODT) 4 MG ODT tab                Procedures and tests performed during your visit     ABO/Rh type and screen        Comprehensive metabolic panel    INR    Lipase    POC US GUIDE FOR PARACENTESIS      Orders Needing Specimen Collection     None      Pending Results     Date and Time Order Name Status Description    8/3/2018 1642 POC US GUIDE FOR PARACENTESIS In process     8/3/2018 1549  In process             Pending Culture Results     No orders found from 8/1/2018 to 8/4/2018.            Pending Results Instructions     If you had any lab results that were not finalized at the time of your Discharge, you can call the ED Lab Result RN at [phone]. You will be contacted by this team for any positive Lab results or changes in treatment. The nurses are available 7 days a week from 10A to 6:30P.  You can leave a message 24 hours per day and they will return your call.        Test Results From Your Hospital Stay        8/3/2018  4:28 PM      Component Results     Component Value Ref Range & Units Status    Sodium 141  133 - 144 mmol/L Final    Potassium 3.9 3.4 - 5.3 mmol/L Final    Chloride 106 94 - 109 mmol/L Final    Carbon Dioxide 25 20 - 32 mmol/L Final    Anion Gap 10 3 - 14 mmol/L Final    Glucose 91 70 - 99 mg/dL Final    Urea Nitrogen 7 7 - 30 mg/dL Final    Creatinine 0.78 0.52 - 1.04 mg/dL Final    GFR Estimate 74 >60 mL/min/1.7m2 Final    Non  GFR Calc    GFR Estimate If Black >90 >60 mL/min/1.7m2 Final    African American GFR Calc    Calcium 8.3 (L) 8.5 - 10.1 mg/dL Final    Bilirubin Total 0.5 0.2 - 1.3 mg/dL Final    Albumin 3.1 (L) 3.4 - 5.0 g/dL Final    Protein Total 6.3 (L) 6.8 - 8.8 g/dL Final    Alkaline Phosphatase 43 40 - 150 U/L Final    ALT 16 0 - 50 U/L Final    AST 25 0 - 45 U/L Final         8/3/2018  4:31 PM      Component Results     Component Value Ref Range & Units Status    INR 1.02 0.86 - 1.14 Final         8/3/2018  4:51 PM      Component Results     Component Value Ref Range & Units Status    ABO O  Final    RH(D) Pos  Final    Antibody Screen Neg  Final    Test Valid Only At [facility]     Final    Specimen Expires 08/06/2018  Final         8/3/2018  4:42 PM      Result not yet available     Exam Begun         8/3/2018  4:51 PM         8/3/2018  4:56 PM      Component Results     Component Value Ref Range & Units Status    Lipase 152 73 - 393 U/L Final                Clinical Quality Measure: Blood Pressure Screening     Your blood pressure was checked while you were in the emergency department today. The last reading we obtained was  BP: 176/87 . Please read the guidelines below about what these numbers mean and what you should do about them.  If your systolic blood pressure (the top number) is less than 120 and your diastolic blood pressure (the bottom number) is less than 80, then your blood pressure is normal. There is nothing more that you need to do about it.  If your systolic blood pressure (the top number) is 120-139 or your diastolic blood pressure  "(the bottom number) is 80-89, your blood pressure may be higher than it should be. You should have your blood pressure rechecked within a year by a primary care provider.  If your systolic blood pressure (the top number) is 140 or greater or your diastolic blood pressure (the bottom number) is 90 or greater, you may have high blood pressure. High blood pressure is treatable, but if left untreated over time it can put you at risk for heart attack, stroke, or kidney failure. You should have your blood pressure rechecked by a primary care provider within the next 4 weeks.  If your provider in the emergency department today gave you specific instructions to follow-up with your doctor or provider even sooner than that, you should follow that instruction and not wait for up to 4 weeks for your follow-up visit.        Thank you for choosing [facility]       Thank you for choosing [facility] for your care. Our goal is always to provide you with excellent care. Hearing back from our patients is one way we can continue to improve our services. Please take a few minutes to complete the written survey that you may receive in the mail after you visit with us. Thank you!        [employer] Information     [employer] lets you send messages to your doctor, view your test results, renew your prescriptions, schedule appointments and more. To sign up, go to [email or web address] . Click on \"Log in\" on the left side of the screen, which will take you to the Welcome page. Then click on \"Sign up Now\" on the right side of the page.     You will be asked to enter the access code listed below, as well as some personal information. Please follow the directions to create your username and password.     Your access code is: [email or web address]  Expires: 2018  9:10 AM     Your access code will  in 90 days. If you need help or a new code, please call your [facility] clinic or [phone].        Care EveryWhere ID     This is your Care EveryWhere ID. " This could be used by other organizations to access your [facility] medical records  [id number]        Equal Access to Services     LEEROY ARMSTRONG : Hadii blas Lua, ricardo gomez, caroline sarmiento, hernandez higgins. So [facility] [phone].    ATENCIÓN: Si habla español, tiene a sifuentes disposición servicios gratuitos de asistencia lingüística. Llame al [phone].    We comply with applicable federal civil rights laws and [place] laws. We do not discriminate on the basis of race, color, national origin, age, disability, sex, sexual orientation, or gender identity.            After Visit Summary       This is your record. Keep this with you and show to your community pharmacist(s) and doctor(s) at your next visit.

## 2018-08-04 ENCOUNTER — HEALTH MAINTENANCE LETTER (OUTPATIENT)
Age: 63
End: 2018-08-04

## 2018-08-04 LAB — CANCER AG125 SERPL-ACNC: 6783 U/ML (ref 0–30)

## 2018-08-06 ENCOUNTER — TELEPHONE (OUTPATIENT)
Dept: FAMILY MEDICINE | Facility: CLINIC | Age: 63
End: 2018-08-06

## 2018-08-06 ENCOUNTER — TRANSFERRED RECORDS (OUTPATIENT)
Dept: HEALTH INFORMATION MANAGEMENT | Facility: CLINIC | Age: 63
End: 2018-08-06

## 2018-08-06 DIAGNOSIS — R19.00 PELVIC MASS: Primary | ICD-10-CM

## 2018-08-06 DIAGNOSIS — R97.1 ELEVATED CANCER ANTIGEN 125 (CA-125): ICD-10-CM

## 2018-08-06 NOTE — TELEPHONE ENCOUNTER
Results were discussed with the patient and her . Severely elevated Ca-125 and retroperitoneal masses concerning for ovarian cancer. Patient is scheduled for paracentesis on 8/7/18. Desires to see [place] [facility] to establish care. Referral placed and phone # given.     Elle

## 2018-08-06 NOTE — TELEPHONE ENCOUNTER
Reason for Call:  Other Patient question    Detailed comments: They had the CT scan Done and they were supposed to get fluid tested from her Abdomen   The staff that works in that department was gone for the night and so they can not get in until tomorrow to get the US to sample the fluid   They want to know if you could help them get this done sooner since the ED mentioned Cancer   [facility] patients  has question and would like to hear from Dr Olguin or Dr Almeida    Phone Number Patient can be reached at: Home number on file [phone] (home)    Best Time: anytime    Can we leave a detailed message on this number? YES    Call taken on 8/6/2018 at 8:34 AM by Rosita Cadet

## 2018-08-06 NOTE — TELEPHONE ENCOUNTER
FS,   Spoke with  (not on NELI)  Pt was with him - talked to both of then  Patient saw you 8/3/2018    Ended up in ER that day as well  Several labs checked   ER providers thinking pt has cancer (maybe ovarian cancer)    /pt wanting lab results -  lab back - abnormal - high  Please advise    Also  wondering if pt needs to see oncologist  Pt has paracentesis scheduled for tomorrow  Pt pretty uncomfortable - cannot sit up d/t amount of fluid in her abdomen  Gave them # for [facility] to see if they can get paracentesis done today    /pt also wanting to know when pt needs to see you for f/u  Please advise.  Thanks,  Angeline BREWSTER RN

## 2018-08-06 NOTE — TELEPHONE ENCOUNTER
Reason for Call: Request for an order or referral:    Order or referral being requested: need to add an additional piece of information to the test being done at [facility] tomorrow.  Please call Dr Ramirez nurse Faustina    Date needed: as soon as possible    Has the patient been seen by the PCP for this problem? YES    Additional comments: There is a addt diag for this patient    Phone number Nurse Weems can be reached at:  [phone]    Best Time:  anytime    Can we leave a detailed message on this number?  NO    Call taken on 8/6/2018 at 12:11 PM by Soco García

## 2018-08-06 NOTE — TELEPHONE ENCOUNTER
Faustina, nurse from Dr Jacobsen's office (oncology) calling stating pt needs cytology on fluid collected from paracentesis tomorrow    Pt has upcoming appt with Dr Jacobsen to discuss options/treatment    Called over to radiology department at [facility] as there are 9 held lab orders in place that fluid needs to be tested for:  Cytology  Lactate  Gram Stain  Glucose Fluid  Anaerobic Bacteria  Fluid Culture  Protein Fluid  Albumin Fluid  Cell Count with Differential    Princess in radiology with be in tomorrow to help with paracentesis and can see held lab orders - she will release them tomorrow and make sure lab orders completed on collected fluid     Nothing further needed at this time as far as orders  Angeline BREWSTER RN

## 2018-08-07 ENCOUNTER — HOSPITAL ENCOUNTER (OUTPATIENT)
Facility: CLINIC | Age: 63
Discharge: HOME | End: 2018-08-07
Admitting: PHYSICIAN ASSISTANT
Payer: COMMERCIAL

## 2018-08-07 ENCOUNTER — HOSPITAL ENCOUNTER (OUTPATIENT)
Dept: ULTRASOUND IMAGING | Facility: CLINIC | Age: 63
End: 2018-08-07
Attending: PHYSICIAN ASSISTANT | Admitting: COLON & RECTAL SURGERY
Payer: COMMERCIAL

## 2018-08-07 VITALS
OXYGEN SATURATION: 99 % | SYSTOLIC BLOOD PRESSURE: 143 MMHG | RESPIRATION RATE: 16 BRPM | DIASTOLIC BLOOD PRESSURE: 59 MMHG | HEART RATE: 72 BPM | TEMPERATURE: 95.7 F

## 2018-08-07 DIAGNOSIS — R18.8 OTHER ASCITES: ICD-10-CM

## 2018-08-07 DIAGNOSIS — R14.0 ABDOMINAL BLOATING: ICD-10-CM

## 2018-08-07 DIAGNOSIS — R93.5 ABNORMAL ABDOMINAL CT SCAN: ICD-10-CM

## 2018-08-07 LAB
ALBUMIN FLD-MCNC: 2.4 G/DL
APPEARANCE FLD: NORMAL
COLOR FLD: YELLOW
GLUCOSE FLD-MCNC: 93 MG/DL
GRAM STN SPEC: NORMAL
LDH FLD L TO P-CCNC: 664 U/L
LYMPHOCYTES NFR FLD MANUAL: 25 %
MONOS+MACROS NFR FLD MANUAL: 21 %
NEUTS BAND NFR FLD MANUAL: 49 %
OTHER CELLS FLD MANUAL: 5 %
PROT FLD-MCNC: 4 G/DL
SPECIMEN SOURCE FLD: NORMAL
SPECIMEN SOURCE: NORMAL
WBC # FLD AUTO: 1150 /UL

## 2018-08-07 PROCEDURE — 87205 SMEAR GRAM STAIN: CPT | Performed by: EMERGENCY MEDICINE

## 2018-08-07 PROCEDURE — 88341 IMHCHEM/IMCYTCHM EA ADD ANTB: CPT | Performed by: EMERGENCY MEDICINE

## 2018-08-07 PROCEDURE — 87070 CULTURE OTHR SPECIMN AEROBIC: CPT | Performed by: EMERGENCY MEDICINE

## 2018-08-07 PROCEDURE — 89051 BODY FLUID CELL COUNT: CPT | Performed by: EMERGENCY MEDICINE

## 2018-08-07 PROCEDURE — 88305 TISSUE EXAM BY PATHOLOGIST: CPT | Performed by: EMERGENCY MEDICINE

## 2018-08-07 PROCEDURE — 88342 IMHCHEM/IMCYTCHM 1ST ANTB: CPT | Performed by: EMERGENCY MEDICINE

## 2018-08-07 PROCEDURE — 27210190 US PARACENTESIS

## 2018-08-07 PROCEDURE — 88112 CYTOPATH CELL ENHANCE TECH: CPT | Mod: 26 | Performed by: EMERGENCY MEDICINE

## 2018-08-07 PROCEDURE — 84157 ASSAY OF PROTEIN OTHER: CPT | Performed by: EMERGENCY MEDICINE

## 2018-08-07 PROCEDURE — 83615 LACTATE (LD) (LDH) ENZYME: CPT | Performed by: EMERGENCY MEDICINE

## 2018-08-07 PROCEDURE — 00000155 ZZHCL STATISTIC H-CELL BLOCK W/STAIN: Performed by: EMERGENCY MEDICINE

## 2018-08-07 PROCEDURE — 88341 IMHCHEM/IMCYTCHM EA ADD ANTB: CPT | Mod: 26 | Performed by: EMERGENCY MEDICINE

## 2018-08-07 PROCEDURE — 88112 CYTOPATH CELL ENHANCE TECH: CPT | Performed by: EMERGENCY MEDICINE

## 2018-08-07 PROCEDURE — 25000125 ZZHC RX 250: Performed by: PHYSICIAN ASSISTANT

## 2018-08-07 PROCEDURE — 88342 IMHCHEM/IMCYTCHM 1ST ANTB: CPT | Mod: 26 | Performed by: EMERGENCY MEDICINE

## 2018-08-07 PROCEDURE — 87075 CULTR BACTERIA EXCEPT BLOOD: CPT | Performed by: EMERGENCY MEDICINE

## 2018-08-07 PROCEDURE — 82945 GLUCOSE OTHER FLUID: CPT | Performed by: EMERGENCY MEDICINE

## 2018-08-07 PROCEDURE — 88305 TISSUE EXAM BY PATHOLOGIST: CPT | Mod: 26 | Performed by: EMERGENCY MEDICINE

## 2018-08-07 PROCEDURE — 40000863 ZZH STATISTIC RADIOLOGY XRAY, US, CT, MAR, NM

## 2018-08-07 PROCEDURE — 82042 OTHER SOURCE ALBUMIN QUAN EA: CPT | Performed by: EMERGENCY MEDICINE

## 2018-08-07 RX ORDER — ALBUMIN (HUMAN) 12.5 G/50ML
12.5 SOLUTION INTRAVENOUS ONCE
Status: CANCELLED | OUTPATIENT
Start: 2018-08-07 | End: 2018-08-07

## 2018-08-07 RX ORDER — ALBUMIN (HUMAN) 12.5 G/50ML
12.5 SOLUTION INTRAVENOUS ONCE
Status: DISCONTINUED | OUTPATIENT
Start: 2018-08-07 | End: 2018-08-07 | Stop reason: HOSPADM

## 2018-08-07 RX ORDER — LIDOCAINE HYDROCHLORIDE 10 MG/ML
10 INJECTION, SOLUTION EPIDURAL; INFILTRATION; INTRACAUDAL; PERINEURAL ONCE
Status: COMPLETED | OUTPATIENT
Start: 2018-08-07 | End: 2018-08-07

## 2018-08-07 RX ADMIN — LIDOCAINE HYDROCHLORIDE 10 ML: 10 INJECTION, SOLUTION INFILTRATION; PERINEURAL at 08:40

## 2018-08-07 NOTE — DISCHARGE INSTRUCTIONS

## 2018-08-07 NOTE — PROGRESS NOTES
RADIOLOGY PROCEDURE NOTE  Patient name: Carie Vaughn  MRN: [id number]  : 1955    Pre-procedure diagnosis: Ascites  Post-procedure diagnosis: Same    Procedure Date/Time: 2018  8:52 AM  Procedure: Paracentesis  Estimated blood loss: None  Specimen(s) collected with description: ascitic fluid  The patient tolerated the procedure well with no immediate complications.  Significant findings:none    See imaging dictation for procedural details.    Provider name: Jarvis Mccartney  Assistant(s):None

## 2018-08-07 NOTE — IP AVS SNAPSHOT
[facility] [address]    [place] [address]    Phone:  [phone]                                       After Visit Summary   8/7/2018    Carie Vaughn    MRN: [id number]           After Visit Summary Signature Page     I have received my discharge instructions, and my questions have been answered. I have discussed any challenges I see with this plan with the nurse or doctor.    ..........................................................................................................................................  Patient/Patient Representative Signature      ..........................................................................................................................................  Patient Representative Print Name and Relationship to Patient    ..................................................               ................................................  Date                                            Time    ..........................................................................................................................................  Reviewed by Signature/Title    ...................................................              ..............................................  Date                                                            Time

## 2018-08-07 NOTE — PROGRESS NOTES
0912 Pt returned from radiology. Bandaid CDI toleft abd. Pt denies pain at this time.     0920 OOB - steady on feet. Ambulates w/o difficulty. Bandaid CDI to puncture site.    0945 Pt discharged per w/c to private vehicle. All personal belongings taken with pt.

## 2018-08-07 NOTE — PROGRESS NOTES
0830 Time Out done.    Paracentesis: Pt tolerated well. Pt hypertensive, see VS.4800cc cc baylee fluid removed from abdomen w/o difficulty. Bandaid applied to site - CDI.   Pt back to [facility]. Detailed report given to Holly GARCIA

## 2018-08-17 NOTE — IR NOTE
Interventional Radiology Intra-procedural Nursing Note    Patient Name: Carie Vaughn  Medical Record Number: [id number]  Today's Date: August 17, 2018    Start Time: 1326  End of procedure time: 1356  Procedure: port placement  Report given to: care suites rn  Time pt departs:  1406    Other Notes: pt tolerated procedure well. Right chest port insertion site c/d/i. Vss. Pt on room air o2 sats >90% respirations regular and unlabored.    Lenka Osorio

## 2018-08-17 NOTE — IP AVS SNAPSHOT
[facility] Interventional Radiology    [address] [place] [address]    Phone:  [phone]                                       After Visit Summary   8/17/2018    Carie Vaughn    MRN: [id number]           After Visit Summary Signature Page     I have received my discharge instructions, and my questions have been answered. I have discussed any challenges I see with this plan with the nurse or doctor.    ..........................................................................................................................................  Patient/Patient Representative Signature      ..........................................................................................................................................  Patient Representative Print Name and Relationship to Patient    ..................................................               ................................................  Date                                            Time    ..........................................................................................................................................  Reviewed by Signature/Title    ...................................................              ..............................................  Date                                                            Time

## 2018-08-17 NOTE — PROGRESS NOTES
Interventional Radiology Pre-Procedure Sedation Assessment   Time of Assessment: 11:15 AM    Expected Level: Moderate Sedation    Indication: Sedation is required for the following type of Procedure: Port a catheter placement    Sedation and procedural consent: Risks, benefits and alternatives were discussed with Patient and Spouse    PO Intake: Appropriately NPO for procedure    ASA Class: Class 2 - MILD SYSTEMIC DISEASE, NO ACUTE PROBLEMS, NO FUNCTIONAL LIMITATIONS.    Mallampati: Grade 1:  Soft palate, uvula, tonsillar pillars, and posterior pharyngeal wall visible    Lungs: Lungs Clear with good breath sounds bilaterally    Heart: Normal heart sounds and rate    History and physical reviewed and no updates needed. I have reviewed the lab findings, diagnostic data, medications, and the plan for sedation. I have determined this patient to be an appropriate candidate for the planned sedation and procedure and have reassessed the patient IMMEDIATELY PRIOR to sedation and procedure.    Vickie Cavanaugh, TATY CNP

## 2018-08-17 NOTE — IP AVS SNAPSHOT
MRN:[id number]                      After Visit Summary   8/17/2018    Carie Vaughn    MRN: [id number]           Visit Information        Department      8/17/2018 10:36 AM [facility] Interventional Radiology          Review of your medicines      UNREVIEWED medicines. Ask your doctor about these medicines        Dose / Directions    ADVIL PO        Dose:  600 mg   Take 600 mg by mouth every 6 hours   Refills:  0       HYDROcodone-acetaminophen 5-325 MG per tablet   Commonly known as:  NORCO        Dose:  1 tablet   Take 1 tablet by mouth every 4 hours as needed for pain   Quantity:  15 tablet   Refills:  0       Levothyroxine Sodium 125 MCG Caps        Quantity:  30 capsule   Refills:  0       OPTIMAG NEURO Powd        Dose:  1 tsp.   Take 1 tsp. by mouth daily   Refills:  0       OSCILLOCOCCINUM PO        Dose:  1 capsule   Take 1 capsule by mouth 3 times daily   Refills:  0       PROBIOTIC DAILY Caps        Refills:  0       VITAMIN C PO        1 TABLET 3 TIMES DAILY   Refills:  0                Protect others around you: Learn how to safely use, store and throw away your medicines at www.disposemymeds.org.         Follow-ups after your visit         Care Instructions        Further instructions from your care team         Port Insertion Discharge Instructions     After you go home:      Have an adult stay with you for the first 6 hours    You may resume your normal diet       For 24 hours - due to the sedation you received:    Relax and take it easy    Do NOT make any important or legal decisions    Do NOT drive or operate machines at home or at work    Do NOT drink alcohol    Care of Puncture Sites:      Keep the dressings on your sites clean & dry for 3 days. Change it only if it gets wet or dirty.    You may shower after the dressing comes off in 3 days    Do not remove the small white strips of tape, if present. Allow them to fall off on their own.     You may cover the  "wound with a bandaid after the dressing is removed if needed for comfort      Activity       Avoid heavy lifting (greater than 10 pounds) or the overuse of your shoulder for 3 days    Bleeding:      If you start bleeding from the incision sites in your chest or neck - or have swelling in your neck, sit down and press on the site for 5-10 minutes.     If bleeding has not stopped after 10 minutes, call your provider.        Call 911 right away if you have heavy bleeding or bleeding that does not stop.      Medicines:      You may resume all medications    For minor pain, you may take Acetaminophen (Tylenol) or Ibuprofen (Advil)    Call the provider who ordered this procedure if:      You have swelling in your neck or over your port site    The incision area is red, swollen, hot or tender    You have chills or a fever greater than 101 F (38 C)    Any questions or concerns    Call  911 or go to the Emergency Room if you have:      Severe chest pain or trouble breathing    Bleeding that you cannot control    Additional Information:      Your port may be accessed right away.     You will need to have your port flushed every 30 days or after each use.      If you have questions call:          [facility] Radiology Dept @ [phone]      The provider who performed your procedure was Dr Sinclair.       Additional Information About Your Visit        [employer] Information     [employer] lets you send messages to your doctor, view your test results, renew your prescriptions, schedule appointments and more. To sign up, go to [email or web address] . Click on \"Log in\" on the left side of the screen, which will take you to the Welcome page. Then click on \"Sign up Now\" on the right side of the page.     You will be asked to enter the access code listed below, as well as some personal information. Please follow the directions to create your username and password.     Your access code is: [email or web address]  Expires: 11/1/2018  9:10 " "AM     Your access code will  in 90 days. If you need help or a new code, please call your [facility] clinic or [phone].        Care EveryWhere ID     This is your Care EveryWhere ID. This could be used by other organizations to access your [facility] medical records  [id number]        Your Vitals Were     Blood Pressure Pulse Temperature Respirations Height Weight    127/69 79 98.2  F (36.8  C) (Oral) 16 1.74 m (5' 8.5\") 109.3 kg (241 lb)    Pulse Oximetry BMI (Body Mass Index)                96% 36.11 kg/m2           Primary Care Provider Office Phone # Fax #    Jessie MEDRANO Sharif,  [phone]      Equal Access to Services     LEEROY ARMSTRONG : Hadii aad ku hadasho Soomaali, waaxda luqadaha, qaybta kaalmada adeegyada, waxsilvio cazares . So [facility] [phone].    ATENCIÓN: Si habla español, tiene a sifuentes disposición servicios gratuitos de asistencia lingüística. Llame al [phone].    We comply with applicable federal civil rights laws and [place] laws. We do not discriminate on the basis of race, color, national origin, age, disability, sex, sexual orientation, or gender identity.            Thank you!     Thank you for choosing [facility] for your care. Our goal is always to provide you with excellent care. Hearing back from our patients is one way we can continue to improve our services. Please take a few minutes to complete the written survey that you may receive in the mail after you visit with us. Thank you!             Medication List: This is a list of all your medications and when to take them. Check marks below indicate your daily home schedule. Keep this list as a reference.      Medications           Morning Afternoon Evening Bedtime As Needed    ADVIL PO   Take 600 mg by mouth every 6 hours                                HYDROcodone-acetaminophen 5-325 MG per tablet   Commonly known as:  NORCO   Take 1 tablet by mouth every 4 hours as needed for pain             "                    Levothyroxine Sodium 125 MCG Caps                                OPTIMAG NEURO Powd   Take 1 tsp. by mouth daily                                OSCILLOCOCCINUM PO   Take 1 capsule by mouth 3 times daily                                PROBIOTIC DAILY Caps                                VITAMIN C PO   1 TABLET 3 TIMES DAILY

## 2018-08-17 NOTE — DISCHARGE INSTRUCTIONS
Port Insertion Discharge Instructions     After you go home:      Have an adult stay with you for the first 6 hours    You may resume your normal diet       For 24 hours - due to the sedation you received:    Relax and take it easy    Do NOT make any important or legal decisions    Do NOT drive or operate machines at home or at work    Do NOT drink alcohol    Care of Puncture Sites:      Keep the dressings on your sites clean & dry for 3 days. Change it only if it gets wet or dirty.    You may shower after the dressing comes off in 3 days    Do not remove the small white strips of tape, if present. Allow them to fall off on their own.     You may cover the wound with a bandaid after the dressing is removed if needed for comfort      Activity       Avoid heavy lifting (greater than 10 pounds) or the overuse of your shoulder for 3 days    Bleeding:      If you start bleeding from the incision sites in your chest or neck - or have swelling in your neck, sit down and press on the site for 5-10 minutes.     If bleeding has not stopped after 10 minutes, call your provider.        Call 911 right away if you have heavy bleeding or bleeding that does not stop.      Medicines:      You may resume all medications    For minor pain, you may take Acetaminophen (Tylenol) or Ibuprofen (Advil)    Call the provider who ordered this procedure if:      You have swelling in your neck or over your port site    The incision area is red, swollen, hot or tender    You have chills or a fever greater than 101 F (38 C)    Any questions or concerns    Call  911 or go to the Emergency Room if you have:      Severe chest pain or trouble breathing    Bleeding that you cannot control    Additional Information:      Your port may be accessed right away.     You will need to have your port flushed every 30 days or after each use.      If you have questions call:          [facility] Radiology Dept @ [phone]      The provider who  performed your procedure was Dr Sinclair.

## 2018-08-17 NOTE — PROGRESS NOTES
Patient returned from IR at 1400. Vital signs stable. Taking food and fluids. No nausea. Discharge instructions reviewed with patient and spouse. Port identification card and kit given to patient. Discharged per wheelchair in medically stable condition to .

## 2018-10-19 PROBLEM — E03.9 HYPOTHYROIDISM: Status: ACTIVE | Noted: 2018-01-01

## 2018-10-19 PROBLEM — C80.0 CARCINOMATOSIS (H): Status: ACTIVE | Noted: 2018-01-01

## 2018-10-19 PROBLEM — G62.9 NEUROPATHY: Status: ACTIVE | Noted: 2018-01-01

## 2018-10-22 NOTE — PROGRESS NOTES
[facility]  [address]  [facility] [address] [phone]  Dept: [phone]    PRE-OP EVALUATION:  Today's date: 10/22/2018    Carie Vaughn (: 1955) presents for pre-operative evaluation assessment as requested by Dr. Jacobsen.  She requires evaluation and anesthesia risk assessment prior to undergoing surgery/procedure for treatment of cancer .    Fax number for surgical facility:   Primary Physician: Jessie Olguin  Type of Anesthesia Anticipated: to be determined    Patient has a Health Care Directive or Living Will:  YES     Preop Questions 10/22/2018   Who is doing your surgery? Dr. Jacobsen   What are you having done? EXPLORATORY LAPAROTOMY, TOTAL ABDOMINAL HYSTERECTOMY, BILATERAL SALPINGO-OOPHORECTOMY,TUMOR DEBULKING, POSSIBLE PELVIC PARAAROTIC LYMPHADENECTOMY, OMENTECTOMY and POSSIBLE BOWEL RESECTION    Date of Surgery/Procedure: 2018   Facility or Hospital where procedure/surgery will be performed: [facility]   1.  Do you have a history of Heart attack, stroke, stent, coronary bypass surgery, or other heart surgery? No   2.  Do you ever have any pain or discomfort in your chest? No   3.  Do you have a history of  Heart Failure? No   4.   Are you troubled by shortness of breath when:  walking on a level surface, or up a slight hill, or at night? No   5.  Do you currently have a cold, bronchitis or other respiratory infection? No   6.  Do you have a cough, shortness of breath, or wheezing? No   7.  Do you sometimes get pains in the calves of your legs when you walk? YES - only when she is going uphill. Lower extremity pain is chronic.   8. Do you or anyone in your family have previous history of blood clots? No   9.  Do you or does anyone in your family have a serious bleeding problem such as prolonged bleeding following surgeries or cuts? No   10. Have you ever had problems with anemia or been told to take iron pills? YES - history  of anemia when she was pregnant, not taking iron now.   11. Have you had any abnormal blood loss such as black, tarry or bloody stools, or abnormal vaginal bleeding? YES - hx of fibroids.    12. Have you ever had a blood transfusion? No   13. Have you or any of your relatives ever had problems with anesthesia? No   14. Do you have sleep apnea, excessive snoring or daytime drowsiness? No   15. Do you have any prosthetic heart valves? No   16. Do you have prosthetic joints? No   17. Is there any chance that you may be pregnant? 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 Patient was diagnosed on 08/2018, she underwent therapeutic and diagnostic paracentesis. She is undergoing neoadjuvant chemo prior to surgical debulking next week. Patient is tolerating chemotherapy with some side effects. peripheral neuropathy is stable. Also reports intermittent constipation for which she has been taking Docusate senna.   Currenlty taking Norco intermittently for abdominal pain. Will switch to Celebrex today.       HYPOTHYROIDISM - Patient has a longstanding history of chronic Hypothyroidism. Patient has been doing well, noting no tremor, insomnia, hair loss or changes in skin texture. Continues to take medications as directed, without adverse reactions or side effects.                                                                                                                                                                                                                      .    MEDICAL HISTORY:     Patient Active Problem List    Diagnosis Date Noted     Carcinomatosis (H) 10/19/2018     Priority: Medium     Hypothyroidism 10/19/2018     Priority: Medium     Neuropathy 10/19/2018     Priority: Medium      No past medical history on file.  Past Surgical History:   Procedure Laterality Date      "BIOPSY      breast     ENT SURGERY      thyroid removed      Current Outpatient Prescriptions   Medication Sig Dispense Refill     Homeopathic Products (OSCILLOCOCCINUM PO) Take 1 capsule by mouth 3 times daily       HYDROcodone-acetaminophen (NORCO) 5-325 MG per tablet Take 1 tablet by mouth every 4 hours as needed for pain 15 tablet 0     Ibuprofen (ADVIL PO) Take 600 mg by mouth every 6 hours       Levothyroxine Sodium 125 MCG CAPS  30 capsule      Magnesium (OPTIMAG NEURO) POWD Take 1 tsp. by mouth daily       Probiotic Product (PROBIOTIC DAILY) CAPS        VITAMIN C OR 1 TABLET 3 TIMES DAILY       OTC products: None, except as noted above    No Known Allergies   Latex Allergy: NO    Social History   Substance Use Topics     Smoking status: Never Smoker     Smokeless tobacco: Never Used     Alcohol use No     History   Drug Use No       REVIEW OF SYSTEMS:   Constitutional, HEENT, cardiovascular, pulmonary, gi and gu systems are negative, except as otherwise noted.    EXAM:   /81 (BP Location: Right arm, Cuff Size: Adult Large)  Pulse 87  Temp 97.1  F (36.2  C) (Oral)  Resp 16  Ht 5' 8\" (1.727 m)  Wt 239 lb 8 oz (108.6 kg)  SpO2 98%  BMI 36.42 kg/m2    GENERAL APPEARANCE: healthy, alert and no distress     EYES: EOMI, PERRL     HENT: ear canals and TM's normal and nose and mouth without ulcers or lesions     NECK: no adenopathy, no asymmetry, masses, or scars and thyroid normal to palpation     RESP: lungs clear to auscultation - no rales, rhonchi or wheezes     CV: regular rates and rhythm, normal S1 S2, no S3 or S4 and no murmur, click or rub     ABDOMEN:  soft, nontender, no HSM or masses and bowel sounds normal     MS: extremities normal- no gross deformities noted, no evidence of inflammation in joints, FROM in all extremities.     SKIN: no suspicious lesions or rashes     NEURO: Normal strength and tone, sensory exam grossly normal, mentation intact and speech normal     PSYCH: mentation " appears normal. and affect normal/bright     LYMPHATICS: No cervical adenopathy    DIAGNOSTICS:   EKG: Not indicated due to non-vascular surgery and low risk of event (age <65 and without cardiac risk factors)    Recent Labs   Lab Test  08/17/18   1115  08/03/18   1549  08/03/18   0913   HGB   --    --   14.6   PLT   --    --   286   INR  1.01  1.02   --    NA   --   141  143   POTASSIUM   --   3.9  4.5   CR   --   0.78  0.84           Lab Results   Component Value Date     10/22/2018    Lab Results   Component Value Date    CHLORIDE 108 10/22/2018    Lab Results   Component Value Date    BUN 8 10/22/2018      Lab Results   Component Value Date    POTASSIUM 4.4 10/22/2018    Lab Results   Component Value Date    CO2 27 10/22/2018    Lab Results   Component Value Date    CR 0.86 10/22/2018        Lab Results   Component Value Date    WBC 4.1 10/22/2018    HGB 11.7 10/22/2018    HCT 37.2 10/22/2018    MCV 88 10/22/2018     10/22/2018     IMPRESSION:   Reason for surgery/procedure: EXPLORATORY LAPAROTOMY, TOTAL ABDOMINAL HYSTERECTOMY, BILATERAL SALPINGO-OOPHORECTOMY,TUMOR DEBULKING, POSSIBLE PELVIC PARAAROTIC LYMPHADENECTOMY, OMENTECTOMY and POSSIBLE BOWEL RESECTION to debulk peritoneal carcinomatosis.   Diagnosis/reason for consult: Preoperative history and physical exam prior to surgery    The proposed surgical procedure is considered HIGH risk.    REVISED CARDIAC RISK INDEX  The patient has the following serious cardiovascular risks for perioperative complications such as (MI, PE, VFib and 3  AV Block):  No serious cardiac risks  INTERPRETATION: 1 risks: Class II (low risk - 0.9% complication rate)    The patient has the following additional risks for perioperative complications:  No identified additional risks      ICD-10-CM    1. Preop general physical exam Z01.818 CBC with platelets differential     Comprehensive metabolic panel     Magnesium   2. 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 Ascites, malignant R18.0    4. Carcinomatosis (H) C80.0    5. Hypothyroidism, unspecified type E03.9    6. Neuropathy G62.9    7. Chemotherapy-induced peripheral neuropathy (H) G62.0     T45.1X5A        RECOMMENDATIONS:     --Patient is to take all scheduled medications on the day of surgery EXCEPT for modifications listed below.   -Hold Motrin today and hold Motrin today and start Celebrex.    APPROVAL GIVEN to proceed with proposed procedure, without further diagnostic evaluation       Signed Electronically by: Janak Almeida MD    Copy of this evaluation report is provided to requesting physician.  125/  140/73    [place] Preop Guidelines    Revised Cardiac Risk Index

## 2018-10-22 NOTE — MR AVS SNAPSHOT
After Visit Summary   10/22/2018    Carie Vaughn    MRN: [id number]           Patient Information     Date Of Birth          1955        Visit Information        Provider Department      10/22/2018 8:20 AM Janak Almeida MD [facility]        Today's Diagnoses     Preop general physical exam    -  1      Care Instructions      Before Your Surgery      Call your surgeon if there is any change in your health. This includes signs of a cold or flu (such as a sore throat, runny nose, cough, rash or fever).    Do not smoke, drink alcohol or take over the counter medicine (unless your surgeon or primary care doctor tells you to) for the 24 hours before and after surgery.    If you take prescribed drugs: Follow your doctor s orders about which medicines to take and which to stop until after surgery.    Eating and drinking prior to surgery: follow the instructions from your surgeon    Take a shower or bath the night before surgery. Use the soap your surgeon gave you to gently clean your skin. If you do not have soap from your surgeon, use your regular soap. Do not shave or scrub the surgery site.  Wear clean pajamas and have clean sheets on your bed.     Before Your Surgery      Call your surgeon if there is any change in your health. This includes signs of a cold or flu (such as a sore throat, runny nose, cough, rash or fever).    Do not smoke, drink alcohol or take over the counter medicine (unless your surgeon or primary care doctor tells you to) for the 24 hours before and after surgery.    If you take prescribed drugs: Follow your doctor s orders about which medicines to take and which to stop until after surgery.    Eating and drinking prior to surgery: follow the instructions from your surgeon    Take a shower or bath the night before surgery. Use the soap your surgeon gave you to gently clean your skin. If you do not have soap from your surgeon, use your regular soap. Do  "not shave or scrub the surgery site.  Wear clean pajamas and have clean sheets on your bed.           Follow-ups after your visit        Your next 10 appointments already scheduled     Nov 01, 2018   Procedure with Minna Jacobsen MD   [facility] (--)    [address], [address]  [place] [address] [phone]              Who to contact     If you have questions or need follow up information about today's clinic visit or your schedule please contact [facility] directly at [phone].  Normal or non-critical lab and imaging results will be communicated to you by MyChart, letter or phone within 4 business days after the clinic has received the results. If you do not hear from us within 7 days, please contact the clinic through MyChart or phone. If you have a critical or abnormal lab result, we will notify you by phone as soon as possible.  Submit refill requests through [employer] or call your pharmacy and they will forward the refill request to us. Please allow 3 business days for your refill to be completed.          Additional Information About Your Visit        Care EveryWhere ID     This is your Care EveryWhere ID. This could be used by other organizations to access your [facility] medical records  [id number]        Your Vitals Were     Pulse Temperature Respirations Height Pulse Oximetry BMI (Body Mass Index)    87 97.1  F (36.2  C) (Oral) 16 5' 8\" (1.727 m) 98% 36.42 kg/m2       Blood Pressure from Last 3 Encounters:   10/22/18 150/81   08/17/18 116/83   08/07/18 143/59    Weight from Last 3 Encounters:   10/22/18 239 lb 8 oz (108.6 kg)   08/17/18 241 lb (109.3 kg)   08/03/18 261 lb (118.4 kg)              We Performed the Following     CBC with platelets differential     Comprehensive metabolic panel     Magnesium        Primary Care Provider Office Phone # Fax #    Jessie Olguin [phone] [address] [address]   "      Equal Access to Services     [facility] PATTI : Hadii aad ku hadkaelneida Lua, wamichada luqadaha, qabrooketa dandregladishernandez hawkins. So [facility] [phone].    ATENCIÓN: Si habla español, tiene a sifuentes disposición servicios gratuitos de asistencia lingüística. Llame al [phone].    We comply with applicable federal civil rights laws and [place] laws. We do not discriminate on the basis of race, color, national origin, age, disability, sex, sexual orientation, or gender identity.            Thank you!     Thank you for choosing [facility]  for your care. Our goal is always to provide you with excellent care. Hearing back from our patients is one way we can continue to improve our services. Please take a few minutes to complete the written survey that you may receive in the mail after your visit with us. Thank you!             Your Updated Medication List - Protect others around you: Learn how to safely use, store and throw away your medicines at www.disposemymeds.org.          This list is accurate as of 10/22/18  8:52 AM.  Always use your most recent med list.                   Brand Name Dispense Instructions for use Diagnosis    ADVIL PO      Take 600 mg by mouth every 6 hours        HYDROcodone-acetaminophen 5-325 MG per tablet    NORCO    15 tablet    Take 1 tablet by mouth every 4 hours as needed for pain        Levothyroxine Sodium 125 MCG Caps     30 capsule         OPTIMAG NEURO Powd      Take 1 tsp. by mouth daily        OSCILLOCOCCINUM PO      Take 1 capsule by mouth 3 times daily        PROBIOTIC DAILY Caps           VITAMIN C PO      1 TABLET 3 TIMES DAILY

## 2018-10-31 NOTE — H&P (VIEW-ONLY)
[facility]  [address]  [facility] [address] [phone]  Dept: [phone]    PRE-OP EVALUATION:  Today's date: 10/22/2018    Carie Vaughn (: 1955) presents for pre-operative evaluation assessment as requested by Dr. Jacobsen.  She requires evaluation and anesthesia risk assessment prior to undergoing surgery/procedure for treatment of cancer .    Fax number for surgical facility:   Primary Physician: Jessie Olguin  Type of Anesthesia Anticipated: to be determined    Patient has a Health Care Directive or Living Will:  YES     Preop Questions 10/22/2018   Who is doing your surgery? Dr. Jacobsen   What are you having done? EXPLORATORY LAPAROTOMY, TOTAL ABDOMINAL HYSTERECTOMY, BILATERAL SALPINGO-OOPHORECTOMY,TUMOR DEBULKING, POSSIBLE PELVIC PARAAROTIC LYMPHADENECTOMY, OMENTECTOMY and POSSIBLE BOWEL RESECTION    Date of Surgery/Procedure: 2018   Facility or Hospital where procedure/surgery will be performed: [facility] [place]   1.  Do you have a history of Heart attack, stroke, stent, coronary bypass surgery, or other heart surgery? No   2.  Do you ever have any pain or discomfort in your chest? No   3.  Do you have a history of  Heart Failure? No   4.   Are you troubled by shortness of breath when:  walking on a level surface, or up a slight hill, or at night? No   5.  Do you currently have a cold, bronchitis or other respiratory infection? No   6.  Do you have a cough, shortness of breath, or wheezing? No   7.  Do you sometimes get pains in the calves of your legs when you walk? YES - only when she is going uphill. Lower extremity pain is chronic.   8. Do you or anyone in your family have previous history of blood clots? No   9.  Do you or does anyone in your family have a serious bleeding problem such as prolonged bleeding following surgeries or cuts? No   10. Have you ever had problems with anemia or been told to take iron pills? YES - history  of anemia when she was pregnant, not taking iron now.   11. Have you had any abnormal blood loss such as black, tarry or bloody stools, or abnormal vaginal bleeding? YES - hx of fibroids.    12. Have you ever had a blood transfusion? No   13. Have you or any of your relatives ever had problems with anesthesia? No   14. Do you have sleep apnea, excessive snoring or daytime drowsiness? No   15. Do you have any prosthetic heart valves? No   16. Do you have prosthetic joints? No   17. Is there any chance that you may be pregnant? 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 Patient was diagnosed on 08/2018, she underwent therapeutic and diagnostic paracentesis. She is undergoing neoadjuvant chemo prior to surgical debulking next week. Patient is tolerating chemotherapy with some side effects. peripheral neuropathy is stable. Also reports intermittent constipation for which she has been taking Docusate senna.   Currenlty taking Norco intermittently for abdominal pain. Will switch to Celebrex today.       HYPOTHYROIDISM - Patient has a longstanding history of chronic Hypothyroidism. Patient has been doing well, noting no tremor, insomnia, hair loss or changes in skin texture. Continues to take medications as directed, without adverse reactions or side effects.                                                                                                                                                                                                                      .    MEDICAL HISTORY:     Patient Active Problem List    Diagnosis Date Noted     Carcinomatosis (H) 10/19/2018     Priority: Medium     Hypothyroidism 10/19/2018     Priority: Medium     Neuropathy 10/19/2018     Priority: Medium      No past medical history on file.  Past Surgical History:   Procedure Laterality Date      "BIOPSY      breast     ENT SURGERY      thyroid removed      Current Outpatient Prescriptions   Medication Sig Dispense Refill     Homeopathic Products (OSCILLOCOCCINUM PO) Take 1 capsule by mouth 3 times daily       HYDROcodone-acetaminophen (NORCO) 5-325 MG per tablet Take 1 tablet by mouth every 4 hours as needed for pain 15 tablet 0     Ibuprofen (ADVIL PO) Take 600 mg by mouth every 6 hours       Levothyroxine Sodium 125 MCG CAPS  30 capsule      Magnesium (OPTIMAG NEURO) POWD Take 1 tsp. by mouth daily       Probiotic Product (PROBIOTIC DAILY) CAPS        VITAMIN C OR 1 TABLET 3 TIMES DAILY       OTC products: None, except as noted above    No Known Allergies   Latex Allergy: NO    Social History   Substance Use Topics     Smoking status: Never Smoker     Smokeless tobacco: Never Used     Alcohol use No     History   Drug Use No       REVIEW OF SYSTEMS:   Constitutional, HEENT, cardiovascular, pulmonary, gi and gu systems are negative, except as otherwise noted.    EXAM:   /81 (BP Location: Right arm, Cuff Size: Adult Large)  Pulse 87  Temp 97.1  F (36.2  C) (Oral)  Resp 16  Ht 5' 8\" (1.727 m)  Wt 239 lb 8 oz (108.6 kg)  SpO2 98%  BMI 36.42 kg/m2    GENERAL APPEARANCE: healthy, alert and no distress     EYES: EOMI, PERRL     HENT: ear canals and TM's normal and nose and mouth without ulcers or lesions     NECK: no adenopathy, no asymmetry, masses, or scars and thyroid normal to palpation     RESP: lungs clear to auscultation - no rales, rhonchi or wheezes     CV: regular rates and rhythm, normal S1 S2, no S3 or S4 and no murmur, click or rub     ABDOMEN:  soft, nontender, no HSM or masses and bowel sounds normal     MS: extremities normal- no gross deformities noted, no evidence of inflammation in joints, FROM in all extremities.     SKIN: no suspicious lesions or rashes     NEURO: Normal strength and tone, sensory exam grossly normal, mentation intact and speech normal     PSYCH: mentation " appears normal. and affect normal/bright     LYMPHATICS: No cervical adenopathy    DIAGNOSTICS:   EKG: Not indicated due to non-vascular surgery and low risk of event (age <65 and without cardiac risk factors)    Recent Labs   Lab Test  08/17/18   1115  08/03/18   1549  08/03/18   0913   HGB   --    --   14.6   PLT   --    --   286   INR  1.01  1.02   --    NA   --   141  143   POTASSIUM   --   3.9  4.5   CR   --   0.78  0.84           Lab Results   Component Value Date     10/22/2018    Lab Results   Component Value Date    CHLORIDE 108 10/22/2018    Lab Results   Component Value Date    BUN 8 10/22/2018      Lab Results   Component Value Date    POTASSIUM 4.4 10/22/2018    Lab Results   Component Value Date    CO2 27 10/22/2018    Lab Results   Component Value Date    CR 0.86 10/22/2018        Lab Results   Component Value Date    WBC 4.1 10/22/2018    HGB 11.7 10/22/2018    HCT 37.2 10/22/2018    MCV 88 10/22/2018     10/22/2018     IMPRESSION:   Reason for surgery/procedure: EXPLORATORY LAPAROTOMY, TOTAL ABDOMINAL HYSTERECTOMY, BILATERAL SALPINGO-OOPHORECTOMY,TUMOR DEBULKING, POSSIBLE PELVIC PARAAROTIC LYMPHADENECTOMY, OMENTECTOMY and POSSIBLE BOWEL RESECTION to debulk peritoneal carcinomatosis.   Diagnosis/reason for consult: Preoperative history and physical exam prior to surgery    The proposed surgical procedure is considered HIGH risk.    REVISED CARDIAC RISK INDEX  The patient has the following serious cardiovascular risks for perioperative complications such as (MI, PE, VFib and 3  AV Block):  No serious cardiac risks  INTERPRETATION: 1 risks: Class II (low risk - 0.9% complication rate)    The patient has the following additional risks for perioperative complications:  No identified additional risks      ICD-10-CM    1. Preop general physical exam Z01.818 CBC with platelets differential     Comprehensive metabolic panel     Magnesium   2. 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 Ascites, malignant R18.0    4. Carcinomatosis (H) C80.0    5. Hypothyroidism, unspecified type E03.9    6. Neuropathy G62.9    7. Chemotherapy-induced peripheral neuropathy (H) G62.0     T45.1X5A        RECOMMENDATIONS:     --Patient is to take all scheduled medications on the day of surgery EXCEPT for modifications listed below.   -Hold Motrin today and hold Motrin today and start Celebrex.    APPROVAL GIVEN to proceed with proposed procedure, without further diagnostic evaluation       Signed Electronically by: Janak Almeida MD    Copy of this evaluation report is provided to requesting physician.  125/  140/73    [place] Preop Guidelines    Revised Cardiac Risk Index

## 2018-11-01 NOTE — IP AVS SNAPSHOT
MRN:[id number]                      After Visit Summary   11/1/2018    Carie Vaughn    MRN: [id number]           Thank you!     Thank you for choosing [facility] for your care. Our goal is always to provide you with excellent care. Hearing back from our patients is one way we can continue to improve our services. Please take a few minutes to complete the written survey that you may receive in the mail after you visit with us. Thank you!        Patient Information     Date Of Birth          1955        Designated Caregiver       Most Recent Value    Caregiver    Will someone help with your care after discharge? yes    Name of designated caregiver Silvano    Phone number of caregiver [phone]    Caregiver address [address] [facility] [address] [place]  [place]      About your hospital stay     You were admitted on:  November 1, 2018 You last received care in the:  [facility] [address] [facility]    You were discharged on:  November 5, 2018        Reason for your hospital stay       Surgery                  Who to Call     For medical emergencies, please call 911.  For non-urgent questions about your medical care, please call your primary care provider or clinic, [phone]  For questions related to your surgery, please call your surgery clinic        Attending Provider     Provider Specialty    Minna Jacobsen MD Oncology       Primary Care Provider Office Phone # Fax #    Jessie Olguin [phone]      After Care Instructions     Activity       Your activity upon discharge: activity as tolerated            Diet       Follow this diet upon discharge: Regular            Discharge Instructions       Discharge instructions following your gynecologic procedure:  Returning to work/activities:  -Nothing per vagina for 8 weeks  -Typically patients can expect to return to light work within 2-4 weeks.  -No driving or operating machinery while taking prescription pain medication.  -You  should avoid heavy lifting until your follow up visit. No lifting greater than 20 pounds for 2 weeks.     Diet:  -as tolerated    Pain:  -Motrin (or other NSAIDs) are a good additional therapy and recommend trying this in addition to other pain relievers.  -Typically there is a minimal to moderate amount of incisional pain after surgery.  - An ice pack is recommended intermittently for the first 48 hours to help reduce pain & swelling.  -Most patients are provided a prescription for medication to take on a short term basis to help relieve the discomfort.  -If pain medication refills are needed we require you contact our office during regular business hours. (8am-5pm Monday-Friday)  -Please be aware that pain medication can cause constipation.  It may be recommended to take an over the counter stool softener as directed to prevent constipation.     Constipation:  -The pain medication you are prescribed at the time of your surgery can cause constipation.   -We recommend that you take an over the counter stool softener such as colace or senokot-s on a regular basis until you have stopped the pain medication or bowel movements are regular. You make take 1-4 tablets twice per day.   -For constipation lasting 3 days please take Milk of Magnesia or Miralax as directed on the bottles. If you have taken Milk of Magnesia and Miralax and still have not had a bowel movement please contact your our office.    Incision/Wound care:  -Leave your steri-strips in place until your post op visit.   -If you have a clear plastic dressing over a gauze on your incision, remove these 1-2 days after discharging from the hospital.   -You many shower 24hrs after surgery.  It is ok to get the steri-strips/incision wet while showering & pat the area dry with a clean towel  -No bathtubs, hot tubs or swimming is recommended for at least 2 weeks.    Vaginal drainage/spotting:  -Following a hysterectomy you may have vaginal drainage/spotting for up  to 4-6 weeks from surgery. If more than a regular period please call our office.     Bladder symptoms:  -You may also have bladder irritation of difficulty starting urination from your surgery and this is normal. Please call if you have pain or burning when you urinate or fevers.     Follow up care:  -You will be asked to see Dr. Jacobsen's Nurse Practitioner in 2 weeks and Dr. Jacobsen TBALEXANDRU.   -If you don't already have an appointment, please contact the office and our staff will happily assist you in scheduling your appointment. Bring your insurance card & government issued ID card to your appointment.    CALL YOUR SURGEON @[phone] FOR ANY OF THE FOLLOWING:  -Temperature greater than 101 degrees Fahrenheit  -Increased pain  -Increased shortness of breath  -Increased bleeding or drainage or vaginal bleeding greater than a regular menses.   -Pus-like drainage, increasing redness, swelling, tenderness, or warmth at the incision site  -Persistent nausea or vomiting                  Follow-up Appointments     Follow-up and recommended labs and tests        With Dr. Ramirez NP in 2 weeks for staple removal.                  Additional Information     If you use hormonal birth control (such as the pill, patch, ring or implants): You'll need a second form of birth control for 7 days (condoms, a diaphragm or contraceptive foam). While in the hospital, you received a medicine called Bridion. Your normal birth control will not work as well for a week after taking this medicine.          Pending Results     Date and Time Order Name Status Description    11/1/2018 1815 Cytology non gyn In process     11/1/2018 1709 Surgical pathology exam In process             Statement of Approval     Ordered          11/05/18 1105  I have reviewed and agree with all the recommendations and orders detailed in this document.  EFFECTIVE NOW     Approved and electronically signed by:  Claribel Elder APRN CNP             Admission Information  "    Date & Time Provider Department Dept. Phone    11/1/2018 Minna Jacobsen MD [place] [address] [facility] [phone]      Your Vitals Were     Blood Pressure Pulse Temperature Respirations Height Weight    145/75 (BP Location: Right arm) 99 96.4  F (35.8  C) (Oral) 16 1.727 m (5' 8\") 106.2 kg (234 lb 2.1 oz)    Pulse Oximetry BMI (Body Mass Index)                96% 35.6 kg/m2          Care EveryWhere ID     This is your Care EveryWhere ID. This could be used by other organizations to access your [facility] medical records  [id number]        Equal Access to Services     LEEROY ARMSTRONG : Destiny Lua, ricardo gomez, caroline sarmiento, hernandez higgins. So [facility] [phone].    ATENCIÓN: Si habla español, tiene a sifuentes disposición servicios gratuitos de asistencia lingüística. Llame al [phone].    We comply with applicable federal civil rights laws and [place] laws. We do not discriminate on the basis of race, color, national origin, age, disability, sex, sexual orientation, or gender identity.               Review of your medicines      START taking        Dose / Directions    enoxaparin 40 MG/0.4ML injection   Commonly known as:  LOVENOX   Used for:  Carcinomatosis (H)        Dose:  40 mg   Inject 0.4 mLs (40 mg) Subcutaneous daily for 28 days   Quantity:  28 Syringe   Refills:  0       HYDROcodone-acetaminophen 5-325 MG per tablet   Commonly known as:  NORCO   Used for:  Carcinomatosis (H)        Dose:  1-2 tablet   Take 1-2 tablets by mouth every 4 hours as needed for pain maximum 10 tablet(s) per day   Quantity:  25 tablet   Refills:  0       senna 8.6 MG tablet   Commonly known as:  SENOKOT   Used for:  Carcinomatosis (H)        Dose:  1-3 tablet   Take 1-3 tablets by mouth 2 times daily as needed for constipation   Quantity:  30 tablet   Refills:  0         CONTINUE these medicines which may have CHANGED, or have new prescriptions. If we are " uncertain of the size of tablets/capsules you have at home, strength may be listed as something that might have changed.        Dose / Directions    ibuprofen 600 MG tablet   Commonly known as:  ADVIL/MOTRIN   This may have changed:    - medication strength  - how much to take  - when to take this  - reasons to take this   Used for:  Carcinomatosis (H)        Dose:  600 mg   Take 1 tablet (600 mg) by mouth every 6 hours as needed for moderate pain   Quantity:  30 tablet   Refills:  1       * LORAZEPAM PO   This may have changed:  Another medication with the same name was changed. Make sure you understand how and when to take each.        Dose:  0.5 mg   Take 0.5 mg by mouth 2 times daily as needed for anxiety   Refills:  0       * LORazepam 0.5 MG tablet   Commonly known as:  ATIVAN   This may have changed:  medication strength   Used for:  Carcinomatosis (H)        Dose:  0.5 mg   Take 1 tablet (0.5 mg) by mouth At Bedtime   Quantity:  60 tablet   Refills:  1       * Notice:  This list has 2 medication(s) that are the same as other medications prescribed for you. Read the directions carefully, and ask your doctor or other care provider to review them with you.      CONTINUE these medicines which have NOT CHANGED        Dose / Directions    COLLAGEN MATRIX-SILVER EX        Externally apply topically 2 times daily as needed (to face)   Refills:  0       COMPAZINE PO        Dose:  10 mg   Take 10 mg by mouth every 6 hours as needed for nausea   Refills:  0       DIGESTIVE ENZYME PO        Dose:  3 capsule   Take 3 capsules by mouth 3 times daily as needed (After Meals) PRIMANZYME   Refills:  0       GAS-X PO        Dose:  1 tablet   Take 1 tablet by mouth 2 times daily as needed for intestinal gas   Refills:  0       LEVOTHYROXINE SODIUM PO        Dose:  125 mcg   Take 125 mcg by mouth daily   Refills:  0       MAGNESIUM PO        Dose:  500 mg   Take 500 mg by mouth At Bedtime   Refills:  0       * NUTRITIONAL  SUPPLEMENT PO        Dose:  1 Dose   Take 1 Dose by mouth every morning Neuro-Mag? Magnesium L-Threonate   Refills:  0       * NUTRITIONAL SUPPLEMENT PO        Dose:  1 tablet   Take 1 tablet by mouth daily as needed (Cold Symptoms) COLD CALM   Refills:  0       OSCILLOCOCCINUM PO        Dose:  1 capsule   Take 1 capsule by mouth 3 times daily   Refills:  0       senna-docusate 8.6-50 MG per tablet   Commonly known as:  SENOKOT-S;PERICOLACE        Dose:  1-2 tablet   Take 1-2 tablets by mouth At Bedtime   Refills:  0       VITAMIN C PO        Dose:  1000 mg   Take 1,000 mg by mouth 3 times daily as needed (Cold Symptoms)   Refills:  0       Vitamin D3 Liqd        Dose:  2000 Units   Take 2,000 Units by mouth daily   Refills:  0       ZOFRAN ODT PO        Dose:  8 mg   Take 8 mg by mouth 2 times daily as needed for nausea   Refills:  0       * Notice:  This list has 2 medication(s) that are the same as other medications prescribed for you. Read the directions carefully, and ask your doctor or other care provider to review them with you.         Where to get your medicines      These medications were sent to [facility] [place], [place] - [address]  [address], [facility] [address]     Phone:  [phone]     enoxaparin 40 MG/0.4ML injection    senna 8.6 MG tablet         Some of these will need a paper prescription and others can be bought over the counter. Ask your nurse if you have questions.     Bring a paper prescription for each of these medications     HYDROcodone-acetaminophen 5-325 MG per tablet    LORazepam 0.5 MG tablet       You don't need a prescription for these medications     ibuprofen 600 MG tablet                Protect others around you: Learn how to safely use, store and throw away your medicines at www.disposemymeds.org.        Information about OPIOIDS     PRESCRIPTION OPIOIDS: WHAT YOU NEED TO KNOW   We gave you an opioid (narcotic) pain medicine. It is important to  manage your pain, but opioids are not always the best choice. You should first try all the other options your care team gave you. Take this medicine for as short a time (and as few doses) as possible.    Some activities can increase your pain, such as bandage changes or therapy sessions. It may help to take your pain medicine 30 to 60 minutes before these activities. Reduce your stress by getting enough sleep, working on hobbies you enjoy and practicing relaxation or meditation. Talk to your care team about ways to manage your pain beyond prescription opioids.    These medicines have risks:    DO NOT drive when on new or higher doses of pain medicine. These medicines can affect your alertness and reaction times, and you could be arrested for driving under the influence (DUI). If you need to use opioids long-term, talk to your care team about driving.    DO NOT operate heavy machinery    DO NOT do any other dangerous activities while taking these medicines.    DO NOT drink any alcohol while taking these medicines.     If the opioid prescribed includes acetaminophen, DO NOT take with any other medicines that contain acetaminophen. Read all labels carefully. Look for the word  acetaminophen  or  Tylenol.  Ask your pharmacist if you have questions or are unsure.    You can get addicted to pain medicines, especially if you have a history of addiction (chemical, alcohol or substance dependence). Talk to your care team about ways to reduce this risk.    All opioids tend to cause constipation. Drink plenty of water and eat foods that have a lot of fiber, such as fruits, vegetables, prune juice, apple juice and high-fiber cereal. Take a laxative (Miralax, milk of magnesia, Colace, Senna) if you don t move your bowels at least every other day. Other side effects include upset stomach, sleepiness, dizziness, throwing up, tolerance (needing more of the medicine to have the same effect), physical dependence and slowed  breathing.    Store your pills in a secure place, locked if possible. We will not replace any lost or stolen medicine. If you don t finish your medicine, please throw away (dispose) as directed by your pharmacist. The [place] Pollution [employer] has more information about safe disposal: https://www.pca.state.[place]/living-green/managing-unwanted-medications             Medication List: This is a list of all your medications and when to take them. Check marks below indicate your daily home schedule. Keep this list as a reference.      Medications           Morning Afternoon Evening Bedtime As Needed    COLLAGEN MATRIX-SILVER EX   Externally apply topically 2 times daily as needed (to face)                                   COMPAZINE PO   Take 10 mg by mouth every 6 hours as needed for nausea   Last time this was given:  10 mg on 11/5/2018 11:23 AM   Next Dose Due:  5:30pm (11/5) if nausea persists                                   DIGESTIVE ENZYME PO   Take 3 capsules by mouth 3 times daily as needed (After Meals) PRIMANZYME                                   enoxaparin 40 MG/0.4ML injection   Commonly known as:  LOVENOX   Inject 0.4 mLs (40 mg) Subcutaneous daily for 28 days   Last time this was given:  40 mg on 11/4/2018  6:56 PM   Next Dose Due:  11/5/18 6pm                    1800               GAS-X PO   Take 1 tablet by mouth 2 times daily as needed for intestinal gas                                   HYDROcodone-acetaminophen 5-325 MG per tablet   Commonly known as:  NORCO   Take 1-2 tablets by mouth every 4 hours as needed for pain maximum 10 tablet(s) per day   Last time this was given:  1 tablet on 11/5/2018  8:21 AM   Next Dose Due:  Could have anytime after 12:30pm (11/5)                                   ibuprofen 600 MG tablet   Commonly known as:  ADVIL/MOTRIN   Take 1 tablet (600 mg) by mouth every 6 hours as needed for moderate pain                                   LEVOTHYROXINE SODIUM PO    Take 125 mcg by mouth daily   Last time this was given:  125 mcg on 11/5/2018  8:20 AM   Next Dose Due:  11/6 AM                                   * LORAZEPAM PO   Take 0.5 mg by mouth 2 times daily as needed for anxiety   Last time this was given:  0.5 mg on 11/5/2018  1:06 PM                                   * LORazepam 0.5 MG tablet   Commonly known as:  ATIVAN   Take 1 tablet (0.5 mg) by mouth At Bedtime   Last time this was given:  0.5 mg on 11/5/2018  1:06 PM                                MAGNESIUM PO   Take 500 mg by mouth At Bedtime                                   * NUTRITIONAL SUPPLEMENT PO   Take 1 Dose by mouth every morning Neuro-Mag? Magnesium L-Threonate                                * NUTRITIONAL SUPPLEMENT PO   Take 1 tablet by mouth daily as needed (Cold Symptoms) COLD CALM                                OSCILLOCOCCINUM PO   Take 1 capsule by mouth 3 times daily                                         senna 8.6 MG tablet   Commonly known as:  SENOKOT   Take 1-3 tablets by mouth 2 times daily as needed for constipation   Next Dose Due:  11/5 evening, while on narcotics                                   senna-docusate 8.6-50 MG per tablet   Commonly known as:  SENOKOT-S;PERICOLACE   Take 1-2 tablets by mouth At Bedtime   Last time this was given:  2 tablets on 11/5/2018  8:20 AM                                   VITAMIN C PO   Take 1,000 mg by mouth 3 times daily as needed (Cold Symptoms)                                   Vitamin D3 Liqd   Take 2,000 Units by mouth daily                                   ZOFRAN ODT PO   Take 8 mg by mouth 2 times daily as needed for nausea   Last time this was given:  4 mg on 11/4/2018  6:00 PM                                   * Notice:  This list has 4 medication(s) that are the same as other medications prescribed for you. Read the directions carefully, and ask your doctor or other care provider to review them with you.              More Information         Discharge Instructions for Abdominal Surgery  Abdominal surgery is done through an incision in your belly. It may take a few weeks or longer to heal from the surgery. This sheet gives instructions on how to care for yourself once you re home.  Medicines  Here is what to expect:    You may be prescribed pain medicine. Do not wait until your pain becomes severe before taking the medicine. It may not work as well if you wait too long to take it between doses.    Most surgeons prescribe stool softeners along with opioid prescriptions. Take these as prescribed.     You may be prescribed antibiotics to help treat or prevent infection. Be sure to take all of the antibiotics even if you start to feel better.  Diet  Dietary tips include:    Follow any diet instructions given by your healthcare provider. You may need to start with liquids and then slowly add solid foods back into your diet.     If you have constipation, your healthcare provider may tell you to add more fiber to your diet. You may also be told to use a laxative or stool softener. These can often be bought over the counter.    Drink plenty of fluids.  Activity  Recommendations include the following:    Rest as often as needed.    Ask your healthcare provider when you can shower or bathe. Have someone nearby in case you need help.    Ask your family and friends to help with chores and errands.    Don t mow the lawn, vacuum, or do any strenuous activities for 4 to 6 weeks.    Avoid lifting anything over 10 pounds for 4 to 6 weeks.    Avoid driving until your healthcare provider says it s OK.    Walk as often as you feel able.    Do the coughing and breathing exercises you were taught in the hospital. If you were given an incentive spirometer to help with breathing, use it as directed. This is important and will help prevent lung infections.     Ask your healthcare provider when you can return to work.  Incision and drain care  Do's and don'ts include the  following:    Keep your incision clean and dry. It s OK to wash the skin around your incision with mild soap and water.    If you have a dressing over your incision, change it as you were told. Replace the dressing if it becomes wet or dirty. In most cases, the dressing can be removed after 48 hours.    If you have a drain, record the amount of drainage daily. You may also need to empty the drain and clean the attached tubing daily. Check with your healthcare provider if you can get your drain wet or if it needs to stay dry at all times.    Don t sit in a bathtub, pool, or hot tub until your incision is closed and any drains are removed.    When coughing or sneezing, hold a pillow firmly against your incision with both hands. This is called  splinting.  Doing this helps protect your incision and decreases belly discomfort.    Avoid picking, scratching, or pulling at your incision.    Don t use oils, or creams on your incision. Ask your healthcare provider before using lotions on your incision.  Follow-up  You will have one or more follow-up visits with your healthcare provider. These are needed to check how well you re healing. Your drain, stitches, or staples may also be removed during these visits.  When to call the healthcare provider  Call your healthcare provider right away if you have any of the following:    Fever of 100.4 F (38 C) or higher, or as advised by your healthcare provider    Chest pain or trouble breathing    Pain or tenderness in the leg    Increased pain, redness, swelling, bleeding, or foul-smelling drainage at the incision site    Incision separates or comes apart    Problems with the drain if you have one    Pain or hardness in your belly that gets worse or isn t relieved by pain medicine    Nausea and vomiting that won t go away    Diarrhea that lasts more than 3 days    Constipation or inability to pass gas for more than 3 days    Dark-colored or bloody urine    Bright red or dark black  stools    Itchy, swollen skin; skin rash   Date Last Reviewed: 7/1/2016 2000-2017 [employer]. [address] [place] [address]. All rights reserved. This information is not intended as a substitute for professional medical care. Always follow your healthcare professional's instructions.                Step-by-Step  Giving Yourself a Shot    Date Last Reviewed: 5/1/2017 2000-2017 [employer]. [address] [place] [address]. All rights reserved. This information is not intended as a substitute for professional medical care. Always follow your healthcare professional's instructions.

## 2018-11-01 NOTE — DISCHARGE SUMMARY
"HOSPITAL DISCHARGE SUMMARY    Patient Name: Carie Vaughn  YOB: 1955 Age: 63 year old  Medical Record Number: [id number]  Primary Physician: Jessie Olguin  Phone: [phone]  Admission Date: 11/1/2018  Discharge Date: 11/5/2018     Carie Vaughn  will be discharged from [facility] to Home.    PRINCIPAL DISCHARGE DIAGNOSIS: Carcinomatosis     BRIEF HOSPITAL COURSE: This 63 year old female admitted following the below listed procedure. She tolerated the procedure well. Uneventful post operative course and discharge to home on POD #5 with adequate pain control, tolerating orals, voiding and ambulating. She was sent with a 28 day supple of Lovenox injections 40mg daily.     PROCEDURES PERFORMED DURING HOSPITALIZATION:   XL  Total abdominal hysterectomy  Bilateral salpingo-oophorectomy  Omentectomy   B/l ureterolysis  Tumor debulking    COMPLICATIONS IN HOSPITAL: None    CONSULTATIONS:      PERTINENT FINDINGS/RESULTS AT DISCHARGE:   /85  Temp 98.3  F (36.8  C)  Resp 16  Ht 1.727 m (5' 8\")  Wt 106 kg (233 lb 11 oz)  SpO2 100%  BMI 35.53 kg/m2    Latest Laboratory Results:  Chem:  CBC RESULTS:   Recent Labs   Lab Test  10/22/18   0858   WBC  4.1   RBC  4.23   HGB  11.7   HCT  37.2   MCV  88   MCH  27.7   MCHC  31.5   RDW  18.1*   PLT  185     Last Basic Metabolic Panel:  Lab Results   Component Value Date     10/22/2018      Lab Results   Component Value Date    POTASSIUM 4.4 10/22/2018     Lab Results   Component Value Date    CHLORIDE 108 10/22/2018     Lab Results   Component Value Date    AMADA 8.6 10/22/2018     Lab Results   Component Value Date    CO2 27 10/22/2018     Lab Results   Component Value Date    BUN 8 10/22/2018     Lab Results   Component Value Date    CR 0.86 10/22/2018     Lab Results   Component Value Date    GLC 84 10/22/2018         IMPORTANT PENDING TEST RESULTS:  Pathology    CONDITION AT DISCHARGE:    Stabilized    DISCHARGE " ORDERS  Current Discharge Medication List      START taking these medications    Details   HYDROcodone-acetaminophen (NORCO) 5-325 MG per tablet Take 1-2 tablets by mouth every 4 hours as needed for pain maximum 10 tablet(s) per day  Qty: 25 tablet, Refills: 0    Associated Diagnoses: Carcinomatosis (H)      senna (SENOKOT) 8.6 MG tablet Take 1-3 tablets by mouth 2 times daily as needed for constipation  Qty: 30 tablet, Refills: 0    Associated Diagnoses: Carcinomatosis (H)         CONTINUE these medications which have CHANGED    Details   ibuprofen (ADVIL/MOTRIN) 600 MG tablet Take 1 tablet (600 mg) by mouth every 6 hours as needed for moderate pain  Qty: 30 tablet, Refills: 1    Associated Diagnoses: Carcinomatosis (H)         CONTINUE these medications which have NOT CHANGED    Details   Ascorbic Acid (VITAMIN C PO) Take 1,000 mg by mouth 3 times daily as needed (Cold Symptoms)      Cholecalciferol (VITAMIN D3) LIQD Take 2,000 Units by mouth daily      COLLAGEN MATRIX-SILVER EX Externally apply topically 2 times daily as needed (to face)      Digestive Enzymes (DIGESTIVE ENZYME PO) Take 3 capsules by mouth 3 times daily as needed (After Meals) PRIMANZYME      LEVOTHYROXINE SODIUM PO Take 125 mcg by mouth daily      !! LORazepam (ATIVAN PO) Take 0.5 mg by mouth At Bedtime      !! LORAZEPAM PO Take 0.5 mg by mouth 2 times daily as needed for anxiety       MAGNESIUM PO Take 500 mg by mouth At Bedtime       !! Nutritional Supplements (NUTRITIONAL SUPPLEMENT PO) Take 1 Dose by mouth every morning Neuro-Mag  Magnesium L-Threonate      !! Nutritional Supplements (NUTRITIONAL SUPPLEMENT PO) Take 1 tablet by mouth daily as needed (Cold Symptoms) COLD CALM      Ondansetron (ZOFRAN ODT PO) Take 8 mg by mouth 2 times daily as needed for nausea      Prochlorperazine Maleate (COMPAZINE PO) Take 10 mg by mouth every 6 hours as needed for nausea       senna-docusate (SENOKOT-S;PERICOLACE) 8.6-50 MG per tablet Take 1-2 tablets by  mouth At Bedtime       Simethicone (GAS-X PO) Take 1 tablet by mouth 2 times daily as needed for intestinal gas      Homeopathic Products (OSCILLOCOCCINUM PO) Take 1 capsule by mouth 3 times daily       !! - Potential duplicate medications found. Please discuss with provider.          DISCHARGE INSTRUCTION.      FOLLOW-UP: Cariemichelle Vaughn should see Jessie Olguin PRN.    Specialty follow-up: as above.     AFTER HOSPITAL RECOMMENDATIONS  As above.      Physician(s) in addition to primary physician who should receive a copy:  Jessie Olguin

## 2018-11-01 NOTE — PROGRESS NOTES
Admission medication history interview status for the 11/1/2018  admission is complete. See EPIC admission navigator for prior to admission medications     Medication history source reliability:Good    Medication history interview source(s):Patient    Medication history resources (including written lists, pill bottles, clinic record):None    Primary [facility]    Additional medication history information not noted on PTA med list :None    Time spent in this activity: 50 minutes    Prior to Admission medications    Medication Sig Last Dose Taking? Auth Provider   Ascorbic Acid (VITAMIN C PO) Take 1,000 mg by mouth 3 times daily as needed (Cold Symptoms) 10/28/2018 at PRN Yes Reported, Patient   Cholecalciferol (VITAMIN D3) LIQD Take 2,000 Units by mouth daily On Month Ago at AM Yes Reported, Patient   COLLAGEN MATRIX-SILVER EX Externally apply topically 2 times daily as needed (to face) 10/30/2018 at PM Yes Reported, Patient   Digestive Enzymes (DIGESTIVE ENZYME PO) Take 3 capsules by mouth 3 times daily as needed (After Meals) PRIMANZYME More than a Month at PRN Yes Reported, Patient   Ibuprofen (ADVIL PO) Take 400-600 mg by mouth 3 times daily as needed  10/22/2018 at PRN Yes Reported, Patient   LEVOTHYROXINE SODIUM PO Take 125 mcg by mouth daily 11/1/2018 at 0700 Yes Reported, Patient   LORazepam (ATIVAN PO) Take 0.5 mg by mouth At Bedtime 10/30/2018 at HS Yes Reported, Patient   LORAZEPAM PO Take 0.5 mg by mouth 2 times daily as needed for anxiety  More than a Month at PRN Yes Reported, Patient   MAGNESIUM PO Take 500 mg by mouth At Bedtime  10/30/2018 at HS Yes Reported, Patient   Nutritional Supplements (NUTRITIONAL SUPPLEMENT PO) Take 1 Dose by mouth every morning Neuro-Mag  Magnesium L-Threonate 10/24/2018 at AM Yes Reported, Patient   Nutritional Supplements (NUTRITIONAL SUPPLEMENT PO) Take 1 tablet by mouth daily as needed (Cold Symptoms) COLD CALM 10/28/2018 Yes Reported, Patient   Ondansetron (ZOFRAN  ODT PO) Take 8 mg by mouth 2 times daily as needed for nausea 10/31/2018 at 1500 Yes Reported, Patient   Prochlorperazine Maleate (COMPAZINE PO) Take 10 mg by mouth every 6 hours as needed for nausea  Past Month at PRN Yes Reported, Patient   senna-docusate (SENOKOT-S;PERICOLACE) 8.6-50 MG per tablet Take 1-2 tablets by mouth At Bedtime  10/30/2018 at HS Yes Reported, Patient   Simethicone (GAS-X PO) Take 1 tablet by mouth 2 times daily as needed for intestinal gas 10/30/2018 at PRN Yes Reported, Patient   Homeopathic Products (OSCILLOCOCCINUM PO) Take 1 capsule by mouth 3 times daily 10/28/2018 at AM  Reported, Patient

## 2018-11-01 NOTE — IP AVS SNAPSHOT
[address], [address]    [facility] [address]    Phone:  [phone]                                       After Visit Summary   11/1/2018    Carie Vaughn    MRN: [id number]           After Visit Summary Signature Page     I have received my discharge instructions, and my questions have been answered. I have discussed any challenges I see with this plan with the nurse or doctor.    ..........................................................................................................................................  Patient/Patient Representative Signature      ..........................................................................................................................................  Patient Representative Print Name and Relationship to Patient    ..................................................               ................................................  Date                                   Time    ..........................................................................................................................................  Reviewed by Signature/Title    ...................................................              ..............................................  Date                                               Time          22EPIC Rev 08/18

## 2018-11-01 NOTE — ANESTHESIA PREPROCEDURE EVALUATION
Procedure: Procedure(s):  EXPLORATORY LAPAROTOMY, TOTAL ABDOMINAL HYSTERECTOMY, BILATERAL SALPINGO-OOPHORECTOMY,TUMOR DEBULKING, POSSIBLE PELVIC PARAAROTIC LYMPHADENECTOMY  OMENTECTOMY  POSSIBLE BOWEL RESECTION   Preop diagnosis: SEROUS CARCINOMA     Allergies   Allergen Reactions     Celebrex [Celecoxib] Other (See Comments)     Dizziness and SOB, heart palpitations, and anxiety     History reviewed. No pertinent past medical history.  Past Surgical History:   Procedure Laterality Date     BIOPSY      breast     ENT SURGERY      thyroid removed      Social History   Substance Use Topics     Smoking status: Never Smoker     Smokeless tobacco: Never Used     Alcohol use No     Prior to Admission medications    Medication Sig Start Date End Date Taking? Authorizing Provider   Ascorbic Acid (VITAMIN C PO) Take 1,000 mg by mouth 3 times daily as needed (Cold Symptoms)   Yes Reported, Patient   Cholecalciferol (VITAMIN D3) LIQD Take 2,000 Units by mouth daily   Yes Reported, Patient   COLLAGEN MATRIX-SILVER EX Externally apply topically 2 times daily as needed (to face)   Yes Reported, Patient   Digestive Enzymes (DIGESTIVE ENZYME PO) Take 3 capsules by mouth 3 times daily as needed (After Meals) PRIMANZYME   Yes Reported, Patient   Ibuprofen (ADVIL PO) Take 400-600 mg by mouth 3 times daily as needed    Yes Reported, Patient   LEVOTHYROXINE SODIUM PO Take 125 mcg by mouth daily   Yes Reported, Patient   LORazepam (ATIVAN PO) Take 0.5 mg by mouth At Bedtime   Yes Reported, Patient   LORAZEPAM PO Take 0.5 mg by mouth 2 times daily as needed for anxiety    Yes Reported, Patient   MAGNESIUM PO Take 500 mg by mouth At Bedtime    Yes Reported, Patient   Nutritional Supplements (NUTRITIONAL SUPPLEMENT PO) Take 1 Dose by mouth every morning Neuro-Mag  Magnesium L-Threonate   Yes Reported, Patient   Nutritional Supplements (NUTRITIONAL SUPPLEMENT PO) Take 1 tablet by mouth daily as needed (Cold Symptoms) COLD CALM   Yes  Reported, Patient   Ondansetron (ZOFRAN ODT PO) Take 8 mg by mouth 2 times daily as needed for nausea   Yes Reported, Patient   Prochlorperazine Maleate (COMPAZINE PO) Take 10 mg by mouth every 6 hours as needed for nausea    Yes Reported, Patient   senna-docusate (SENOKOT-S;PERICOLACE) 8.6-50 MG per tablet Take 1-2 tablets by mouth At Bedtime    Yes Reported, Patient   Simethicone (GAS-X PO) Take 1 tablet by mouth 2 times daily as needed for intestinal gas   Yes Reported, Patient   Homeopathic Products (OSCILLOCOCCINUM PO) Take 1 capsule by mouth 3 times daily    Reported, Patient     Current Facility-Administered Medications Ordered in Epic   Medication Dose Route Frequency Last Rate Last Dose     ceFAZolin (ANCEF) 1 g vial to attach to  ml bag for ADULT or 50 ml bag for PEDS  1 g Intravenous See Admin Instructions         ceFAZolin (ANCEF) intermittent infusion 2 g in 100 mL dextrose PRE-MIX  2 g Intravenous Pre-Op/Pre-procedure x 1 dose         lactated ringers infusion   Intravenous Continuous 25 mL/hr at 11/01/18 1328       lidocaine 1 % 1 mL  1 mL Other Q1H PRN         No current [facility]-ordered outpatient prescriptions on file.       lactated ringers 25 mL/hr at 11/01/18 1328     Wt Readings from Last 1 Encounters:   11/01/18 106 kg (233 lb 11 oz)     Temp Readings from Last 1 Encounters:   11/01/18 36.8  C (98.3  F)     BP Readings from Last 6 Encounters:   11/01/18 165/85   10/22/18 150/81   08/17/18 116/83   08/07/18 143/59   08/03/18 176/87   08/03/18 151/83     Pulse Readings from Last 4 Encounters:   10/22/18 87   08/17/18 79   08/07/18 72   08/03/18 100     Resp Readings from Last 1 Encounters:   11/01/18 16     SpO2 Readings from Last 1 Encounters:   11/01/18 100%     Recent Labs   Lab Test  10/22/18   0858  08/03/18   1549   NA  141  141   POTASSIUM  4.4  3.9   CHLORIDE  108  106   CO2  27  25   ANIONGAP  6  10   GLC  84  91   BUN  8  7   CR  0.86  0.78   AMADA  8.6  8.3*     Recent Labs   Lab Test   10/22/18   0858  08/03/18   1549   AST  19  25   ALT  26  16   ALKPHOS  47  43   BILITOTAL  0.2  0.5   LIPASE   --   152     Recent Labs   Lab Test  10/22/18   0858  08/03/18   0913   WBC  4.1  7.3   HGB  11.7  14.6   PLT  185  286     Recent Labs   Lab Test  11/01/18   1259   ABO  O   RH  Pos     Recent Labs   Lab Test  08/17/18   1115  08/03/18   1549   INR  1.01  1.02   PTT  25   --           Anesthesia Evaluation     .             ROS/MED HX    ENT/Pulmonary:      (-) asthma, COPD and sleep apnea   Neurologic:     (+)neuropathy     Cardiovascular:  - neg cardiovascular ROS       METS/Exercise Tolerance:     Hematologic:     (+) Anemia, -      Musculoskeletal:         GI/Hepatic:        (-) GERD   Renal/Genitourinary:         Endo:     (+) thyroid problem hypothyroidism, .      Psychiatric:         Infectious Disease:         Malignancy:   (+) Malignancy           Other:                     Physical Exam  Normal systems: dental    Airway   Mallampati: III  TM distance: <3 FB  Neck ROM: full    Dental     Cardiovascular       Pulmonary                     Anesthesia Plan      History & Physical Review  History and physical reviewed and following examination; no interval change.    ASA Status:  2 .    NPO Status:  > 8 hours    Plan for General and ETT   PONV prophylaxis:  Ondansetron (or other 5HT-3) and Dexamethasone or Solumedrol  Additional equipment: Videolaryngoscope      Postoperative Care      Consents  Anesthetic plan, risks, benefits and alternatives discussed with:  Patient..                          .

## 2018-11-01 NOTE — PROCEDURES
POST OPERATIVE NOTE-IMMEDIATE :  Preoperative Diagnosis:  SEROUS CARCINOMA     Postoperative Diagnosis:  Same     NATIONAL GUIDELINE REFERENCED FOR TREATMENT PLANNING: NCCN    Procedures:  XL, PIERRE/BSO  Omentectomy  Tumor debulking  B/l ureterolysis     Prosthetic Devices:  None    Surgeon(s) and Assistants (if any):  Surgeon(s):  Minna Jacobsen MD  Circulator: Jean-Claude Yanez RN  Scrub Person: Chas Huggins    Anesthesia:  General    Drains:  none    Specimens:  Uterus, cervix, bilateral fallopian tubes and ovaries, omentum.     Complications: none    Findings/Conclusions:  Minimal residual disease in pelvis, some omental caking.     Estimated Blood Loss: 200cc     Condition on discharge from OR:  Satisfactory      Claribel Elder   On Behalf of  Surgeon(s):  Minna Jacobsen MD

## 2018-11-01 NOTE — OR NURSING
Patient had , Silvano sign her surgical consent because IV in right hand and she didn't want to use it to write.

## 2018-11-02 NOTE — PLAN OF CARE
Problem: Patient Care Overview  Goal: Plan of Care/Patient Progress Review  Outcome: Improving  POD 1.  AOx4. VSS on RA. 1 L NC while sleeping. Capno d/c'ed. Pain controled with PCA dilaudid. BS: faint, no flatus. Abd incision with dried drainage, no change this shift. Abd binder in place. Numbness and tingling at baseline. Clear liquid diet, AAT, fair appetite. Mild nausea, declined medication intervention, willing to try seabands. Jocelin patent. Up A1, dangled at bedside x2, declined to stand. R Port infusing D5 1/2 NS + 20K @75, abx. Plan pending. Continue to monitor.

## 2018-11-02 NOTE — PLAN OF CARE
Problem: Pain, Acute (Adult)  Goal: Acceptable Pain Control/Comfort Level  Patient will demonstrate the desired outcomes by discharge/transition of care.   Outcome: Improving  POD 1. Pt. Arrived to floor around 2030. AO x 4, drowsy. VSS on RA, placed on 2 L overnight per pt. Request. C/o pain 2-4 in abd, using PCA pump. On scheduled toradol, pt. C/o anxiousness and discomfort after receiving and would like to hold off for now. Abd incision with staples and abd binder in place. Scant amount of dried drainage. Clear liquid diet, AAT. C/o nausea after activity this morning, given zofran. Denies flatus, c/o gas pain. Numbness and tingling at baseline. IV infusing with fluids and PCA pump. A1. Dangled at bedside this morning and took a few steps.

## 2018-11-02 NOTE — PROGRESS NOTES
"[facility]  Hospitalist Progress Note          Assessment and Plan:    1.Carcinomatosis (H):  POD1 X-lap, TAHBSO, bilateral ureterolysis, omentectomy, tumor debulking.  Pain control adequate with PCA.  Monreal can be removed when able to ambulate to the bathroom.  Continue PCA today with plan to transition to oral pain meds when taking PO's well.  May use port.  Routine Post op care.  2. Hypothyroidism:  On synthroid  3.  DVT prophylaxis:  On heparin subcutaneous.  Will discharge with 4 wks of lovenox.  Needs teaching on subcutaneous administration.                 Interval History:   doing well; no cp, sob, n/v/d, pain reasonably controlled with PCA              Medications:   I have reviewed this patient's current medications               Physical Exam:   Blood pressure 124/70, pulse 88, temperature 96.4  F (35.8  C), temperature source Oral, resp. rate 16, height 1.727 m (5' 8\"), weight 106.6 kg (235 lb), SpO2 96 %.        Vital Sign Ranges  Temperature Temp  Av.9  F (36.6  C)  Min: 95.2  F (35.1  C)  Max: 99  F (37.2  C)   Blood pressure Systolic (24hrs), Av , Min:124 , Max:165        Diastolic (24hrs), Av, Min:69, Max:89      Pulse Pulse  Av.3  Min: 83  Max: 97   Respirations Resp  Av.7  Min: 12  Max: 28   Pulse oximetry SpO2  Av.8 %  Min: 95 %  Max: 100 %         Intake/Output Summary (Last 24 hours) at 18 0839  Last data filed at 18 0605   Gross per 24 hour   Intake             2200 ml   Output              850 ml   Net             1350 ml       Lungs:   Clear     Cardiovascular:   normal apical pulses      Abdomen:   BS hypoactive, dressing with serosanguinous staining,  Non-distended     Musculoskeletal:   no lower extremity pitting edema present                Data:   All laboratory data reviewed  "

## 2018-11-02 NOTE — ANESTHESIA POSTPROCEDURE EVALUATION
Patient: Carie Vaughn    Procedure(s):  EXPLORATORY LAPAROTOMY, TOTAL ABDOMINAL HYSTERECTOMY, BILATERAL SALPINGO-OOPHORECTOMY, BILATERAL URETEROLYSIS, WASHINGS  OMENTECTOMY    Diagnosis:SEROUS CARCINOMA   Diagnosis Additional Information: No value filed.    Anesthesia Type:  General, ETT    Note:  Anesthesia Post Evaluation    Patient location during evaluation: bedside  Patient participation: Able to fully participate in evaluation  Level of consciousness: awake  Pain management: adequate  Airway patency: patent  Cardiovascular status: acceptable  Respiratory status: acceptable  Hydration status: acceptable  PONV: none     Anesthetic complications: None    Comments: No anesthetic complications noted.         Last vitals:  Vitals:    11/01/18 2010 11/01/18 2020 11/01/18 2049   BP: 150/87  153/69   Resp: 17 13 13   Temp:   35.1  C (95.2  F)   SpO2: 99% 99% 98%         Electronically Signed By: Abimael Chambers DO, DO  November 1, 2018  8:56 PM

## 2018-11-03 NOTE — PLAN OF CARE
Problem: Patient Care Overview  Goal: Plan of Care/Patient Progress Review  Outcome: No Change   A&Ox4. VSS on 1L O2. Up A1 w GB, walked bain x2 this shift- needs encouragement. IVF infusing at 75ml/hr. Pain managed with PCA dilaudid. C/o mild nausea-seabands in place, scheduled ativan with relief. C/o gas pains primarily. Midline incision with large, dried drainage on dressing-no change. Abd binder in place.BS faint, - gas. Baseline n/t of BUE and BLE (hands and feet). Monreal with adeq. UO- discontinued at 6 am: DTV. Continue to monitor.

## 2018-11-03 NOTE — PLAN OF CARE
Problem: Patient Care Overview  Goal: Plan of Care/Patient Progress Review  Outcome: Improving  POD: 2 AOx4. VSS on RA. PCA dilaudid d/c'ed, transitioned to PRN IV dilauded x1, effective. BS: active, no flatus. Midline incision dried drainage, dressing changed. Abd binder in place. Clear liquid, fair appetite, advanced to a full liquid diet. C/o nausea, seabands in place, declined medication. Up A1. Sat in chair.  Ambulated bain x1. Monreal removed at 0600, voiding adequately in BR. R Port HL, abx. Plan pending. Continue to monitor.

## 2018-11-03 NOTE — PROVIDER NOTIFICATION
MD Notification    Notified Person: MD    Notified Person Name: Delmar    Notification Date/Time: 1525 11/3/18    Notification Interaction: Text page    Purpose of Notification: Oral dilaudid ordered did you want IV dilaudid for breakthrough?    Orders Received: IV dilauid .5mg every 4 hours for breakthrough pain     Comments:

## 2018-11-03 NOTE — PROGRESS NOTES
"[facility]  Hospitalist Progress Note          Assessment and Plan:    1.Carcinomatosis (H):  POD2 X-lap, TAHBSO, bilateral ureterolysis, omentectomy, tumor debulking.  Pain control adequate and ambulating.  Still requiring small amount of oxygen.  Stop IVF and PCA and convert to oral pain meds with IV dilaudid for breakthrough pain.  Encourage use of Incentive spirometer. May use port.  May remove dressing.  Routine Post op care.  Await final patholoyg.  2. Hypothyroidism:  On synthroid  3.  DVT prophylaxis:  On heparin subcutaneous.  Will discharge with 4 wks of lovenox.  Needs teaching on subcutaneous administration.                   Interval History:   doing well; no cp, sob, n/v/d, pain reasonable controlled              Medications:   I have reviewed this patient's current medications               Physical Exam:   Blood pressure 137/64, pulse 90, temperature 98  F (36.7  C), temperature source Oral, resp. rate 16, height 1.727 m (5' 8\"), weight 106.6 kg (235 lb), SpO2 96 %.        Vital Sign Ranges  Temperature Temp  Av.8  F (36.6  C)  Min: 97  F (36.1  C)  Max: 99.5  F (37.5  C)   Blood pressure Systolic (24hrs), Av , Min:125 , Max:137        Diastolic (24hrs), Av, Min:59, Max:73      Pulse No Data Recorded   Respirations Resp  Av.3  Min: 16  Max: 18   Pulse oximetry SpO2  Av.8 %  Min: 96 %  Max: 99 %         Intake/Output Summary (Last 24 hours) at 18 1211  Last data filed at 18 0602   Gross per 24 hour   Intake             2734 ml   Output             2150 ml   Net              584 ml       Lungs:   Clear     Cardiovascular:   normal apical pulses      Abdomen:   Soft, non-distended, hypoactive BS, dressing intact     Musculoskeletal:   no lower extremity pitting edema present                Data:   All laboratory data reviewed  "

## 2018-11-04 NOTE — PLAN OF CARE
Problem: Patient Care Overview  Goal: Plan of Care/Patient Progress Review  Outcome: Improving  Patient is A&O, VSS on RA, CMS intact, Full liquid diet, up with assist of 1, abdominal dressing CDI and supported with binder. IV dilaudid for pain control, R chest port heparin lock, and denies passing flatus. Encourage to move around more. Will continue to monitor

## 2018-11-04 NOTE — PROGRESS NOTES
"[facility]  Hospitalist Progress Note          Assessment and Plan:    1.Carcinomatosis (H):  POD 3 X-lap, TAHBSO, bilateral ureterolysis, omentectomy, tumor debulking.  Pain control adequate and ambulating.  Off oxygen.  Convert to oral pain meds with IV dilaudid for breakthrough pain.  Encourage use of Incentive spirometer. May use port.  May remove dressing.  Routine Post op care.  Await final pathology.  Advance diet to regular.  Home when passing flatus - likely tomorrow.  2. Hypothyroidism:  On synthroid  3.  DVT prophylaxis:  On heparin subcutaneous.  Will discharge with 4 wks of lovenox.  Needs teaching on subcutaneous administration Carcinomatosis (H)                   Interval History:   no new complaints and doing well; no cp, sob, n/v/d.  Pain reasonably controlled              Medications:   I have reviewed this patient's current medications               Physical Exam:   Blood pressure 137/68, pulse 90, temperature 96.9  F (36.1  C), temperature source Oral, resp. rate 16, height 1.727 m (5' 8\"), weight 105.1 kg (231 lb 11.2 oz), SpO2 93 %.        Vital Sign Ranges  Temperature Temp  Av.5  F (36.4  C)  Min: 96.8  F (36  C)  Max: 98.1  F (36.7  C)   Blood pressure Systolic (24hrs), Av , Min:128 , Max:144        Diastolic (24hrs), Av, Min:63, Max:68      Pulse No Data Recorded   Respirations Resp  Av  Min: 16  Max: 16   Pulse oximetry SpO2  Av.8 %  Min: 93 %  Max: 96 %         Intake/Output Summary (Last 24 hours) at 18 1011  Last data filed at 18 0848   Gross per 24 hour   Intake             1048 ml   Output              825 ml   Net              223 ml       Abdomen:   More BS present today.  Minimal distension.  Dressing changed yesterday     Musculoskeletal:   no lower extremity pitting edema present                Data:   All laboratory data reviewed  "

## 2018-11-04 NOTE — PLAN OF CARE
Problem: Patient Care Overview  Goal: Plan of Care/Patient Progress Review  Outcome: Improving   Pt is A&Ox4. VSS ex tachy at times. Tearful this AM, feeling overwhelmed. Full liquid diet. R port hep locked. Abd binder in place, dressing CDI. C/o of pain in abdomen, dilaudid given x1, pt requests pushing very slow as it causes her to become nauseous. Up with A1 GB and walker. Not passing any gas, bs faint. Slept between cares.

## 2018-11-04 NOTE — PLAN OF CARE
Problem: Patient Care Overview  Goal: Plan of Care/Patient Progress Review  Outcome: Improving   AOx4, anxious at times. VSS on RA. C/o abd pain, controlled with oral Norco x1, effective. Midline incision, dressing changed, CDI. Abd binder in place. BS: active, no flatus. Advanced to regular diet, per MD, poor appetite. Nausea improved. R chest port HL, abx. Up A1. Ambulated bain x2, needs encouragment. Voiding adequately in BR. Plan pending, possibly discharge tomorrow. Continue to monitor.

## 2018-11-05 NOTE — OP NOTE
Procedure Date: 11/01/2018      DATE OF PROCEDURE:  11/01/2018      PREOPERATIVE DIAGNOSIS:  Peritoneal carcinomatosis.      POSTOPERATIVE DIAGNOSIS:  Peritoneal carcinomatosis.      SURGEON:  JANIS Jacobsen MD      ASSISTANT:   Claribel Elder CNP      ANESTHESIA:  General endotracheal anesthesia.      PROCEDURE:  Exploratory laparotomy, washings, radical resection of pelvic tumor with PIERRE/BSO, lysis of adhesions and bilateral ureterolysis, omentectomy and tumor debulking.        INDICATIONS FOR THE PROCEDURE:  The patient is a 63-year-old female who presented at the beginning of August to Dr. Almeida with complaints of abdominal pain and distention and constipation of 2-3 months duration.  She reported 3 years previously having an episode of postmenopausal bleeding and endometrial biopsy had been negative and pelvic ultrasound at that time had shown uterine fibroids.  At the time she was seen a CT scan of the abdomen and pelvis was obtained and revealed a very small hiatal hernia, moderate nodularity within the peritoneal cavity, primarily of the greater omentum, moderate amount of free fluid and uterine fibroids.  CA-125 was 6783 units/mL.  She underwent a paracentesis for 4800 mL of fluid.  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  She subsequently underwent another paracentesis for 4000 mL, had a port placed, underwent her first cycle of neoadjuvant carboplatin and Taxol on 08/21/2018.  She had subsequently underwent paracentesis and had 2825 mL removed, and on 09/11 had cycle 2 of carboplatin and Taxol administered.  Her CA-125 at that time was 8777.9 units/mL.  On 10/02/2018, she had her third cycle of carboplatin and Taxol.  Her CA-125 had decreased to 1338 units/mL.  Follow-up CT scan after 3 cycles showed mild nodularity present throughout the intraperitoneal fat of  the upper and mid abdomen and the region of the greater omentum, and they had mildly to moderately decreased in prominence since 08/03/2018.  She had a very small amount of free fluid in the pelvis and a slight haziness about the fat in the mid descending colon.  She was seen in my office and had no evidence of a fluid wave and on rectovaginal exam her cervix and uterus were mobile.  We elected to proceed with cytoreductive surgery at this point.      FINDINGS:  The patient was found to have a moderate omental cake, but it could be completely freed up from the transverse colon, gallbladder, stomach and spleen, and the transverse mesocolon without any difficulty.  She had no significant diaphragmatic or infrahepatic disease.  She had a small amount of disease in the distal pericolic gutters on the left greater than the right-hand side.  She did have her bowel tethered to the posterior aspect of the uterus and this had probably been involved with tumor at some point.  Because of the chemotherapy we were able to separate that out, and she had involvement of the vesicouterine peritoneum as well as the uterine serosa as well.  At the completion of her to surgery, she had no gross visible disease and she had no palpably abnormal lymph nodes in the pelvic or periaortic distribution bilaterally.      PROCEDURE IN DETAIL:  The patient was taken to the operating room.  She received broad-spectrum antibiotic prophylaxis.  Knee-high sequential compression devices were placed on her lower extremities.  She was placed in a supine position on the operating room table.  General endotracheal anesthesia was administered in the usual fashion.  Once intubated, she was repositioned in low lithotomy position using well-padded Adolfo stirrups.  Her arms were padded and left on arm boards below her shoulder levels.  She was prepped and draped in the usual sterile fashion including insertion of a 16-Cameroonian Monreal catheter to her bladder.  A  timeout was conducted after she had been prepped and draped, and everyone agreed upon the procedure.  We started with a midline vertical incision from the symphysis pubis, around the right side of the navel and into the mid upper abdomen.  This was taken down through the subcutaneous tissue and through the fascia with electrocautery.  The muscles were divided in the midline and the posterior rectus sheath was opened.  We opened the peritoneal cavity and there was no significant nodularity.  There was a small amount of residual fluid present that was aspirated as we entered the abdomen.  The peritoneal incision was extended superiorly and inferiorly to match her abdominal incision.  A Bookwalter retractor was placed around the incision, lateral and superior blades were utilized initially.  I freed up the ascending and descending colon along the white line of Toldt and this allowed me to mobilize the omentum medially.  The peritoneal attachments of the omentum from the hepatic flexure to the splenic flexure were taken down off of the transverse colon.  We mobilized the gastrocolic ligament.  We freed the omentum up from the inferior aspect of the gallbladder.  We then used the LigaSure device to come across the omentum just below the gastroepiploic arcade and out towards the spleen.  We then transected the omentum with the LigaSure out near the spleen and a large portion of the omentum with its involved tumor was removed.  I ran the small bowel and there was no involvement.  The appendix appeared clean.  In both pericolic gutters distally there was some residual disease that had been treated.  This was excised once the peritoneum was freed up from the bowel on either side and the area of involved peritoneum was removed from the underlying tissues.  We now used inferior retractor blades.  We placed the patient in some Trendelenburg and packed the bowel superiorly.  We opened up the posterior broad ligament peritoneum  lateral to the ovarian vessels.  We cauterized and divided the round ligaments on either side with the LigaSure device.  We came across the vesicouterine peritoneum in the mid bladder area in an area that was not involved with tumor or had not been involved with tumor.  We carefully dissected the bladder off of this peritoneum and took it off the anterior surface of the lower uterine segment and cervix sequentially.  We had to mobilize the bowel and in doing so, came across the peritoneum and entered the rectovaginal septum eventually to move the bowel away from the uterus.  On either side we tagged the ureters and they were dissected off the medial aspect of the posterior broad ligament peritoneum and tagged with vessel loops.  We divided what we felt was involved peritoneum away from the pelvic sidewalls and this went with the uterine specimen.  On both sides at the isthmus, we cauterized and divided the tissues with the LigaSure device.  We then worked our way down a very long cervix with straight Zeppelins, moving the bladder out of the way sequentially and making sure the colon was away posteriorly.  For each bite taken with Zeppelin clamps we divided the tissue and suture ligated in a transfixing fashion with 0 Vicryl suture on a CT2 needle.  This went down to and included the uterosacral ligaments and once we got down beyond the cervix and making sure the bladder was adequately down, we put 2 gently curved Zeppelins across the angles of the vagina and the cervix was amputated free.  The angles were secured with 0 Vicryl suture on a CT1 needle in a transfixing fashion and the anterior and posterior aspects of the vagina were closed with 0 Vicryl suture on a CT1 needle as well.  We irrigated the pelvis.  The irrigant was sent off as washings.  We did put a little bit of Surgiflo and the rectovaginal septum where we had done some dissecting.  I palpated the lymph nodes in the pelvic and aortic area and there  were no enlarged lymph nodes.  At that point, we elected to close the patient's abdomen.  The sponge and instruments were removed.  Seprafilm was laid in the pelvis and it was laid over the bowel underneath the incision.  The patient's abdomen was closed in a mass closure using #1 looped PDS suture from superior and inferior aspects to the midline.  This was reinforced with #1 Ethibond sutures in an interrupted fashion.  The subcutaneous tissue was irrigated and the skin was reapproximated with staples.  An island dressing was placed over this.  An abdominal binder will be placed as she is transferred to the bed.  Her anesthesia was reversed.  She was extubated and taken to recovery room in stable condition.  Estimated blood loss was 200 mL.      Claribel Elder CNP, was my primary assist.  She helped with all aspects of the case including her half of the hysterectomy.  She provided necessary countertraction for the omentectomy and helped with opening and closing of the abdomen.         NAILA YOST MD             D: 2018   T: 2018   MT: CC      Name:     BHUMIKA CONNOLLY   MRN:      [id number]        Account:        [id number]   :      1955           Procedure Date: 2018      Document: [id number]       cc: Jessie Olguin DO

## 2018-11-05 NOTE — PLAN OF CARE
Problem: Patient Care Overview  Goal: Plan of Care/Patient Progress Review  Outcome: No Change  POD 4.  Pt A&Ox4.  VSS on RA.  Pain managed w/Norco and hot packs.  Nausea managed w/Compazine.  No emesis.  Intermittent numbness and tingling in all extremities at baseline.  Up w/SBA.  Voiding adequately.  R chest port heparin locked.  Midline incision CDI.  Abdominal binder in place.  Bowel sounds hypoactive, +flatus.  Regular diet.  Possible discharge today.  Nursing to continue to monitor.

## 2018-11-05 NOTE — PLAN OF CARE
Problem: Patient Care Overview  Goal: Plan of Care/Patient Progress Review  Outcome: Adequate for Discharge Date Met: 11/05/18  Pt A/O.  Vitally stable.  Incision open to air.  Lovenox video shown and  demonstrated proper administration. Discharge meds and instructions given, questions answered.  Port needle removed.  Pt and family know to follow-up within 2 weeks for staple removal.  Discharged off unit via wheelchair to home.

## 2018-11-05 NOTE — PROGRESS NOTES
"[facility]  Hospitalist Progress Note          Assessment and Plan:    1.Carcinomatosis (H):  POD 4 X-lap, TAHBSO, bilateral ureterolysis, omentectomy, tumor debulking.  Pain control adequate and ambulating. Routine Post op care.  Await final pathology.  Passing flatus. Tolerating regular diet. Sensitive to smells. Home today. F/u appt 11/15 for staple removal.   2. Hypothyroidism:  On synthroid  3.  DVT prophylaxis:  On heparin subcutaneous.  Will discharge with 4 wks of lovenox. Teaching done. Script sent.                  Interval History:   no new complaints and doing well; no cp, sob, n/v/d. Passing flatus.  Pain reasonably controlled              Medications:   I have reviewed this patient's current medications               Physical Exam:   Blood pressure 133/73, pulse 99, temperature 96.4  F (35.8  C), temperature source Oral, resp. rate 16, height 1.727 m (5' 8\"), weight 106.2 kg (234 lb 2.1 oz), SpO2 96 %.        Vital Sign Ranges  Temperature Temp  Av.5  F (36.4  C)  Min: 96.8  F (36  C)  Max: 98.1  F (36.7  C)   Blood pressure Systolic (24hrs), Av , Min:128 , Max:144        Diastolic (24hrs), Av, Min:63, Max:68      Pulse No Data Recorded   Respirations Resp  Av  Min: 16  Max: 16   Pulse oximetry SpO2  Av.8 %  Min: 93 %  Max: 96 %         Intake/Output Summary (Last 24 hours) at 18 1011  Last data filed at 18 0848   Gross per 24 hour   Intake             1048 ml   Output              825 ml   Net              223 ml       Abdomen:   BS + Minimal distension.  Dressing changed      Musculoskeletal:   no lower extremity pitting edema present                Data:   All laboratory data reviewed  "

## 2018-11-05 NOTE — PLAN OF CARE
Problem: Patient Care Overview  Goal: Plan of Care/Patient Progress Review  Outcome: No Change  Pt needs enc to walk. Worsening nausea this evening with poor po intake. No emesis.  Zofran given. Abd obese, binder in place, dressing CDI. Hypoactive BS. Enc activity. One episode of flatus.  Sl HTN.

## 2018-11-06 NOTE — TELEPHONE ENCOUNTER
Hospital F/U 11/5/2018 DX:Carcinomatosis (H), Serous Carcinoma ED/IP 1/1    [phone] (home)

## 2018-11-06 NOTE — TELEPHONE ENCOUNTER
ED / Discharge Outreach Protocol    Patient Contact    Attempt # 1    Was call answered?  No.  Left message on voicemail with information to call me back.  Note from discharge summary. States to follow up with PN. Patient has never seen PN before only FS.    PRINCIPAL DISCHARGE DIAGNOSIS: Carcinomatosis   FOLLOW-UP: Carie Vaughn should see Jessie Olguin.    Amirah Jain RN

## 2018-11-07 NOTE — TELEPHONE ENCOUNTER
ED / Discharge Outreach Protocol    Patient Contact    Attempt # 2    Was call answered?  No.  Left message on voicemail with information to call me back.  Amirah Jain RN

## 2019-01-01 ENCOUNTER — TRANSFERRED RECORDS (OUTPATIENT)
Dept: HEALTH INFORMATION MANAGEMENT | Facility: CLINIC | Age: 64
End: 2019-01-01

## 2019-01-01 ENCOUNTER — HOSPITAL ENCOUNTER (OUTPATIENT)
Dept: ULTRASOUND IMAGING | Facility: CLINIC | Age: 64
End: 2019-07-30
Attending: FAMILY MEDICINE | Admitting: RADIOLOGY
Payer: COMMERCIAL

## 2019-01-01 ENCOUNTER — TELEPHONE (OUTPATIENT)
Dept: FAMILY MEDICINE | Facility: CLINIC | Age: 64
End: 2019-01-01

## 2019-01-01 ENCOUNTER — HOSPITAL ENCOUNTER (OUTPATIENT)
Facility: CLINIC | Age: 64
Discharge: HOME | End: 2019-07-30
Attending: RADIOLOGY | Admitting: RADIOLOGY
Payer: COMMERCIAL

## 2019-01-01 ENCOUNTER — AMBULATORY - HEALTHEAST (OUTPATIENT)
Dept: OTHER | Facility: CLINIC | Age: 64
End: 2019-01-01

## 2019-01-01 ENCOUNTER — OFFICE VISIT (OUTPATIENT)
Dept: FAMILY MEDICINE | Facility: CLINIC | Age: 64
End: 2019-01-01
Payer: COMMERCIAL

## 2019-01-01 ENCOUNTER — MYC REFILL (OUTPATIENT)
Dept: FAMILY MEDICINE | Facility: CLINIC | Age: 64
End: 2019-01-01

## 2019-01-01 ENCOUNTER — DOCUMENTATION ONLY (OUTPATIENT)
Dept: OTHER | Facility: CLINIC | Age: 64
End: 2019-01-01

## 2019-01-01 ENCOUNTER — HOME INFUSION (PRE-WILLOW HOME INFUSION) (OUTPATIENT)
Dept: PHARMACY | Facility: CLINIC | Age: 64
End: 2019-01-01

## 2019-01-01 ENCOUNTER — MYC MEDICAL ADVICE (OUTPATIENT)
Dept: FAMILY MEDICINE | Facility: CLINIC | Age: 64
End: 2019-01-01

## 2019-01-01 ENCOUNTER — OFFICE VISIT (OUTPATIENT)
Dept: URGENT CARE | Facility: URGENT CARE | Age: 64
End: 2019-01-01
Payer: COMMERCIAL

## 2019-01-01 VITALS
OXYGEN SATURATION: 97 % | DIASTOLIC BLOOD PRESSURE: 80 MMHG | TEMPERATURE: 96.4 F | SYSTOLIC BLOOD PRESSURE: 138 MMHG | RESPIRATION RATE: 16 BRPM | HEART RATE: 78 BPM | BODY MASS INDEX: 33.8 KG/M2 | WEIGHT: 223 LBS | HEIGHT: 68 IN

## 2019-01-01 VITALS
RESPIRATION RATE: 20 BRPM | HEIGHT: 68 IN | DIASTOLIC BLOOD PRESSURE: 88 MMHG | TEMPERATURE: 96.2 F | HEART RATE: 123 BPM | WEIGHT: 229 LBS | BODY MASS INDEX: 34.71 KG/M2 | OXYGEN SATURATION: 97 % | SYSTOLIC BLOOD PRESSURE: 160 MMHG

## 2019-01-01 VITALS
BODY MASS INDEX: 33.91 KG/M2 | WEIGHT: 223 LBS | DIASTOLIC BLOOD PRESSURE: 80 MMHG | TEMPERATURE: 98.2 F | OXYGEN SATURATION: 98 % | HEART RATE: 113 BPM | SYSTOLIC BLOOD PRESSURE: 160 MMHG

## 2019-01-01 VITALS
DIASTOLIC BLOOD PRESSURE: 70 MMHG | OXYGEN SATURATION: 97 % | BODY MASS INDEX: 33.8 KG/M2 | RESPIRATION RATE: 20 BRPM | SYSTOLIC BLOOD PRESSURE: 130 MMHG | HEIGHT: 68 IN | WEIGHT: 223 LBS | TEMPERATURE: 97.4 F | HEART RATE: 73 BPM

## 2019-01-01 VITALS
OXYGEN SATURATION: 96 % | RESPIRATION RATE: 16 BRPM | DIASTOLIC BLOOD PRESSURE: 78 MMHG | SYSTOLIC BLOOD PRESSURE: 126 MMHG | HEART RATE: 96 BPM

## 2019-01-01 DIAGNOSIS — R00.0 TACHYCARDIA: ICD-10-CM

## 2019-01-01 DIAGNOSIS — E03.0 CONGENITAL HYPOTHYROIDISM WITH DIFFUSE GOITER: ICD-10-CM

## 2019-01-01 DIAGNOSIS — I10 ESSENTIAL HYPERTENSION, BENIGN: Primary | ICD-10-CM

## 2019-01-01 DIAGNOSIS — C78.6 PERITONEAL CARCINOMATOSIS (H): ICD-10-CM

## 2019-01-01 DIAGNOSIS — I10 ESSENTIAL HYPERTENSION, BENIGN: ICD-10-CM

## 2019-01-01 DIAGNOSIS — R73.01 IMPAIRED FASTING GLUCOSE: ICD-10-CM

## 2019-01-01 DIAGNOSIS — D84.9 IMMUNOSUPPRESSION (H): ICD-10-CM

## 2019-01-01 DIAGNOSIS — E66.09 CLASS 1 OBESITY DUE TO EXCESS CALORIES WITH SERIOUS COMORBIDITY AND BODY MASS INDEX (BMI) OF 34.0 TO 34.9 IN ADULT: ICD-10-CM

## 2019-01-01 DIAGNOSIS — T50.905A ADVERSE EFFECT OF DRUG, INITIAL ENCOUNTER: ICD-10-CM

## 2019-01-01 DIAGNOSIS — E66.811 CLASS 1 OBESITY DUE TO EXCESS CALORIES WITH SERIOUS COMORBIDITY AND BODY MASS INDEX (BMI) OF 34.0 TO 34.9 IN ADULT: ICD-10-CM

## 2019-01-01 DIAGNOSIS — C48.2 MALIGNANT NEOPLASM OF PERITONEUM, UNSPECIFIED (H): ICD-10-CM

## 2019-01-01 DIAGNOSIS — F32.2 CURRENT SEVERE EPISODE OF MAJOR DEPRESSIVE DISORDER WITHOUT PSYCHOTIC FEATURES WITHOUT PRIOR EPISODE (H): ICD-10-CM

## 2019-01-01 DIAGNOSIS — R05.8 PRODUCTIVE COUGH: Primary | ICD-10-CM

## 2019-01-01 LAB
ALT SERPL-CCNC: 16 IU/L (ref 7–52)
AST SERPL-CCNC: 25 U/L (ref 13–39)
CREAT SERPL-MCNC: 0.9 MG/DL (ref 0.6–1.3)
GFR SERPL CREATININE-BSD FRML MDRD: 68.6 ML/MIN/1.73M2
GLUCOSE SERPL-MCNC: 113 MG/DL (ref 70–110)
INR PPP: 1.09 (ref 0.86–1.14)
PLATELET # BLD AUTO: 171 10E9/L (ref 150–450)
POTASSIUM SERPL-SCNC: 3.8 MMOL/L (ref 3.5–5.1)

## 2019-01-01 PROCEDURE — 25000125 ZZHC RX 250: Performed by: RADIOLOGY

## 2019-01-01 PROCEDURE — 99213 OFFICE O/P EST LOW 20 MIN: CPT | Performed by: FAMILY MEDICINE

## 2019-01-01 PROCEDURE — 27210190 US PARACENTESIS

## 2019-01-01 PROCEDURE — 40000863 ZZH STATISTIC RADIOLOGY XRAY, US, CT, MAR, NM

## 2019-01-01 PROCEDURE — 85610 PROTHROMBIN TIME: CPT | Performed by: RADIOLOGY

## 2019-01-01 PROCEDURE — 36415 COLL VENOUS BLD VENIPUNCTURE: CPT | Performed by: RADIOLOGY

## 2019-01-01 PROCEDURE — 99214 OFFICE O/P EST MOD 30 MIN: CPT | Performed by: FAMILY MEDICINE

## 2019-01-01 PROCEDURE — 99214 OFFICE O/P EST MOD 30 MIN: CPT

## 2019-01-01 PROCEDURE — 85049 AUTOMATED PLATELET COUNT: CPT | Performed by: RADIOLOGY

## 2019-01-01 RX ORDER — METOPROLOL SUCCINATE 25 MG/1
25 TABLET, EXTENDED RELEASE ORAL DAILY
Qty: 90 TABLET | Refills: 0 | Status: CANCELLED | OUTPATIENT
Start: 2019-01-01

## 2019-01-01 RX ORDER — PROCHLORPERAZINE MALEATE 10 MG
TABLET ORAL
Refills: 1 | COMMUNITY
Start: 2019-01-01

## 2019-01-01 RX ORDER — METOPROLOL SUCCINATE 50 MG/1
50 TABLET, EXTENDED RELEASE ORAL DAILY
Qty: 30 TABLET | Refills: 0 | Status: CANCELLED | OUTPATIENT
Start: 2019-01-01

## 2019-01-01 RX ORDER — NICOTINE POLACRILEX 4 MG
15-30 LOZENGE BUCCAL
Status: DISCONTINUED | OUTPATIENT
Start: 2019-01-01 | End: 2019-01-01 | Stop reason: HOSPADM

## 2019-01-01 RX ORDER — LISINOPRIL 10 MG/1
TABLET ORAL
Refills: 1 | COMMUNITY
Start: 2019-01-01 | End: 2019-01-01

## 2019-01-01 RX ORDER — GABAPENTIN 300 MG/1
CAPSULE ORAL
Refills: 1 | COMMUNITY
Start: 2019-01-01

## 2019-01-01 RX ORDER — METOCLOPRAMIDE 10 MG/1
10 TABLET ORAL 3 TIMES DAILY PRN
COMMUNITY

## 2019-01-01 RX ORDER — LISINOPRIL/HYDROCHLOROTHIAZIDE 10-12.5 MG
TABLET ORAL
COMMUNITY
Start: 2019-01-01 | End: 2019-01-01

## 2019-01-01 RX ORDER — LEVOTHYROXINE SODIUM 125 UG/1
TABLET ORAL
Refills: 0 | COMMUNITY
Start: 2018-01-01

## 2019-01-01 RX ORDER — DEXTROSE MONOHYDRATE 25 G/50ML
25-50 INJECTION, SOLUTION INTRAVENOUS
Status: DISCONTINUED | OUTPATIENT
Start: 2019-01-01 | End: 2019-01-01 | Stop reason: HOSPADM

## 2019-01-01 RX ORDER — LIDOCAINE/PRILOCAINE 2.5 %-2.5%
CREAM (GRAM) TOPICAL
Refills: 0 | COMMUNITY
Start: 2018-01-01

## 2019-01-01 RX ORDER — METOPROLOL SUCCINATE 50 MG/1
TABLET, EXTENDED RELEASE ORAL
Qty: 90 TABLET | Refills: 2 | Status: SHIPPED | OUTPATIENT
Start: 2019-01-01

## 2019-01-01 RX ORDER — AZITHROMYCIN 250 MG/1
TABLET, FILM COATED ORAL
Qty: 6 TABLET | Refills: 0 | Status: SHIPPED | OUTPATIENT
Start: 2019-01-01 | End: 2019-01-01

## 2019-01-01 RX ORDER — METOPROLOL SUCCINATE 50 MG/1
50 TABLET, EXTENDED RELEASE ORAL DAILY
Qty: 30 TABLET | Refills: 0 | Status: SHIPPED | OUTPATIENT
Start: 2019-01-01 | End: 2019-01-01

## 2019-01-01 RX ORDER — LIDOCAINE HYDROCHLORIDE 10 MG/ML
10 INJECTION, SOLUTION EPIDURAL; INFILTRATION; INTRACAUDAL; PERINEURAL ONCE
Status: COMPLETED | OUTPATIENT
Start: 2019-01-01 | End: 2019-01-01

## 2019-01-01 RX ORDER — METOPROLOL SUCCINATE 25 MG/1
25 TABLET, EXTENDED RELEASE ORAL DAILY
Qty: 90 TABLET | Refills: 0 | Status: SHIPPED | OUTPATIENT
Start: 2019-01-01

## 2019-01-01 RX ORDER — CELECOXIB 100 MG/1
CAPSULE ORAL
Refills: 0 | COMMUNITY
Start: 2018-01-01 | End: 2019-01-01

## 2019-01-01 RX ORDER — GABAPENTIN 300 MG/1
300 CAPSULE ORAL DAILY
COMMUNITY

## 2019-01-01 RX ADMIN — LIDOCAINE HYDROCHLORIDE 10 ML: 10 INJECTION, SOLUTION EPIDURAL; INFILTRATION; INTRACAUDAL; PERINEURAL at 14:08

## 2019-01-01 ASSESSMENT — ANXIETY QUESTIONNAIRES
GAD7 TOTAL SCORE: 1
6. BECOMING EASILY ANNOYED OR IRRITABLE: NOT AT ALL
3. WORRYING TOO MUCH ABOUT DIFFERENT THINGS: NOT AT ALL
2. NOT BEING ABLE TO STOP OR CONTROL WORRYING: NOT AT ALL
7. FEELING AFRAID AS IF SOMETHING AWFUL MIGHT HAPPEN: NOT AT ALL
IF YOU CHECKED OFF ANY PROBLEMS ON THIS QUESTIONNAIRE, HOW DIFFICULT HAVE THESE PROBLEMS MADE IT FOR YOU TO DO YOUR WORK, TAKE CARE OF THINGS AT HOME, OR GET ALONG WITH OTHER PEOPLE: NOT DIFFICULT AT ALL
1. FEELING NERVOUS, ANXIOUS, OR ON EDGE: SEVERAL DAYS
GAD7 TOTAL SCORE: 1
5. BEING SO RESTLESS THAT IT IS HARD TO SIT STILL: NOT AT ALL

## 2019-01-01 ASSESSMENT — MIFFLIN-ST. JEOR
SCORE: 1647.24
SCORE: 1620.02
SCORE: 1620.02

## 2019-01-01 ASSESSMENT — PATIENT HEALTH QUESTIONNAIRE - PHQ9
5. POOR APPETITE OR OVEREATING: NOT AT ALL
SUM OF ALL RESPONSES TO PHQ QUESTIONS 1-9: 5

## 2019-01-02 NOTE — PROGRESS NOTES
SUBJECTIVE: Carie Vaughn is a 63 year old female presenting with a chief complaint of nasal congestion and cough .  Onset of symptoms was 1 week(s) ago.  Course of illness is worsening.    Severity moderate  Current and Associated symptoms: runny nose, stuffy nose and cough - productive  Treatment measures tried include Tylenol/Ibuprofen.  Predisposing factors include chemo.    No past medical history on file.  Allergies   Allergen Reactions     Celebrex [Celecoxib] Other (See Comments)     Dizziness and SOB, heart palpitations, and anxiety     Social History     Tobacco Use     Smoking status: Never Smoker     Smokeless tobacco: Never Used   Substance Use Topics     Alcohol use: No       ROS:  SKIN: no rash  GI: no vomiting    OBJECTIVE:  /80   Pulse 113   Temp 98.2  F (36.8  C) (Oral)   Wt 101.2 kg (223 lb)   SpO2 98%   BMI 33.91 kg/m  GENERAL APPEARANCE: healthy, alert and no distress  EYES: EOMI,  PERRL, conjunctiva clear  HENT: ear canals and TM's normal.  Nose and mouth without ulcers, erythema or lesions  NECK: supple, nontender, no lymphadenopathy  RESP: lungs clear to auscultation - no rales, rhonchi or wheezes  SKIN: no suspicious lesions or rashes      ICD-10-CM    1. Productive cough R05 azithromycin (ZITHROMAX) 250 MG tablet   2. Immunosuppression (H) D89.9        Fluids/Rest, f/u if worse/not any better

## 2019-03-19 PROBLEM — I10 ESSENTIAL HYPERTENSION, BENIGN: Status: ACTIVE | Noted: 2019-01-01

## 2019-03-19 PROBLEM — R00.0 TACHYCARDIA: Status: ACTIVE | Noted: 2019-01-01

## 2019-03-19 PROBLEM — C78.6 PERITONEAL CARCINOMATOSIS (H): Status: ACTIVE | Noted: 2019-01-01

## 2019-03-19 PROBLEM — E66.811 CLASS 1 OBESITY DUE TO EXCESS CALORIES WITH SERIOUS COMORBIDITY AND BODY MASS INDEX (BMI) OF 34.0 TO 34.9 IN ADULT: Status: ACTIVE | Noted: 2019-01-01

## 2019-03-19 PROBLEM — E66.09 CLASS 1 OBESITY DUE TO EXCESS CALORIES WITH SERIOUS COMORBIDITY AND BODY MASS INDEX (BMI) OF 34.0 TO 34.9 IN ADULT: Status: ACTIVE | Noted: 2019-01-01

## 2019-03-19 PROBLEM — R73.01 IMPAIRED FASTING GLUCOSE: Status: ACTIVE | Noted: 2019-01-01

## 2019-03-19 NOTE — PROGRESS NOTES
SUBJECTIVE:   Carie Vaughn is a 64 year old female who presents to clinic today for the following health issues:      Hypothyroidism Follow-up      Since last visit, patient describes the following symptoms: Weight stable, no hair loss, no skin changes, no constipation, no loose stools    On 125 levothyroxine     for yrs     TSH = wnl per endocrine in 10-18-just prior to the ca andchemo       Amount of exercise or physical activity: None    Problems taking medications regularly: No    Medication side effects: none    Diet: regular (no restrictions)    Glucose Intolerance   Follow-up      Patient is checking blood sugars: not at all    FBS to 155 since onset chemo     Diabetic concerns: None     Symptoms of hypoglycemia (low blood sugar): none     Paresthesias (numbness or burning in feet) or sores: No     Date of last diabetic eye exam: 2018    BP Readings from Last 2 Encounters:   03/19/19 160/88   01/02/19 160/80     No results found for: A1C, LDL      Hypertension & Tachycardia  new onset since ca & chemo 11-18 and now higher since 2-19 post on avastin  Which is known to cause this       Outpatient blood pressures are not being checked.  --Here > 180/85-90  -- to 120     Low Salt Diet: no added salt      NONMORBID OBESITY     - BMI= 34.8   -little wt loss with the chemo and is trying to keep wt up per oncol       Problem list and histories reviewed & adjusted, as indicated.  Additional history: as documented    Labs reviewed in EPIC    Reviewed and updated as needed this visit by clinical staff  Tobacco  Allergies  Meds  Problems  Med Hx  Surg Hx  Fam Hx  Soc Hx        Reviewed and updated as needed this visit by Provider  Tobacco  Allergies  Meds  Problems  Med Hx  Surg Hx  Fam Hx         ROS:  CONSTITUTIONAL: NEGATIVE for fever, chills, change in weight-weak   INTEGUMENTARY/SKIN: NEGATIVE for worrisome rashes, moles or lesions  EYES: NEGATIVE for vision changes or  "irritation  ENT/MOUTH: NEGATIVE for ear, mouth and throat problems  RESP: NEGATIVE for significant cough or SOB  BREAST: NEGATIVE for masses, tenderness or discharge  CV: NEGATIVE for chest pain, palpitations or peripheral edema  GI: NEGATIVE fo, abdominal pain, heartburn, or change in bowel habits  POS r nausea  : NEGATIVE for frequency, dysuria, or hematuria  MUSCULOSKELETAL: NEGATIVE for significant arthralgias or myalgia  NEURO: NEGATIVE for weakness, dizziness or paresthesias  ENDOCRINE: NEGATIVE for temperature intolerance, skin/hair changes  HEME: NEGATIVE for bleeding problems  PSYCHIATRIC: NEGATIVE for changes in mood or affect  -tearful when tells me that oncol gives her only a yr to live     OBJECTIVE:     /88   Pulse 123   Temp 96.2  F (35.7  C) (Tympanic)   Resp 20   Ht 1.727 m (5' 8\")   Wt 103.9 kg (229 lb)   LMP  (LMP Unknown)   SpO2 97%   Breastfeeding? No   BMI 34.82 kg/m    Body mass index is 34.82 kg/m .  GENERAL: healthy, alert, no distress, over weight, frail, elderly and fatigued  EYES: Eyes grossly normal to inspection, PERRL and conjunctivae and sclerae normal  RESP: lungs clear to auscultation - no rales, rhonchi or wheezes  CV: regular rate and rhythm, normal S1 S2, no S3 or S4, no murmur, click or rub, no peripheral edema and peripheral pulses strong  MS: no gross musculoskeletal defects noted, no edema  SKIN: no suspicious lesions or rashes  NEURO: Normal strength and tone, mentation intact and speech normal  PSYCH: mentation appears normal, affect normal/bright, tearful, anxious, fatigued and appearance well groomed    Diagnostic Test Results:  Results for orders placed or performed during the hospital encounter of 11/01/18   Surgical pathology exam   Result Value Ref Range    Copath Report       Patient Name: BHUMIKA CONNOLLY  MR#: [id number]  Specimen #: [id number]  Collected: 11/1/2018  Received: 11/2/2018  Reported: 11/7/2018 11:10  Ordering Phy(s): NAILA " LISA YOST    For improved result formatting, select 'View Enhanced Report Format' under   Linked Documents section.    SPECIMEN(S):  A: Omentum resection  B: Left paracolic gutter  C: Uterus, with bilateral ovaries and fallopian tubes  D: Right paracolic peritoneum    FINAL DIAGNOSIS:  A: Omentum, resection  - High-grade papillary serous carcinoma    B: Soft tissue, left paracolic gutter, excision  - High-grade papillary serous carcinoma    C: Uterus, bilateral ovaries and fallopian tubes, resection  - High-grade papillary serous carcinoma involving uterine serosa, left   peritubal soft tissue and left ovarian  surface (see comment)    Benign proliferative endometrium with endometrial polyp, leiomyoma    D: Soft tissue, right pericolic peritoneum, excision  - High grade papillary serous carcinoma    COMMENT:  The  findings are that of metastatic high-grade papillary serous carcinoma.    An origin for this lesion is not  apparent within endometrium, fallopian tube or ovary.  As such, the   findings are most compatible with a  primary peritoneal serous carcinoma.  The history of previous treatment is   noted.  Clinical correlation is  required.    Report Name: Ovary, Fallopian Tube, or Primary Peritoneum       Status: Submitted Checklist Inst: 1      Last Updated By: Phong Santos M.D., 11/07/2018 11:09:08  Part(s) Involved:  A: Omentum resection    Synoptic Report:    SPECIMEN    Procedure:        - Total hysterectomy and bilateral salpingo-oophorectomy    Specimen Integrity of Right Ovary:        - Capsule intact    Specimen Integrity of Left Ovary:        - Capsule intact    Specimen Integrity of the Right Fallopian Tube:        - Serosa intact    Specimen Integrity of the Left Fallopian Tube:        - Serosa intact    TUMOR    Tumor Site:        - Primary peritoneum    Histologic Type:         - Serous carcinoma    Histologic Grade:        - High grade    Tumor Size      Tumor Size: 40 Centimeters  "(cm)    Tumor Extent      Ovarian Surface Involvement:          - Present      Fallopian Tube Surface Involvement:          - Present      Other Tissue / Organ Involvement:          - Left ovary          - Left fallopian tube          - Pelvic peritoneum          - Omentum      Largest Extrapelvic Peritoneal Focus:          - Macroscopic (greater than 2 cm)      Peritoneal Ascitic Fluid:          - Not submitted / unknown    Accessory Findings      Treatment Effect:          - No definite or minimal response identified (chemotherapy   response          score [CRS] 1)    LYMPH NODES    Regional Lymph Nodes:        - No lymph nodes submitted or found    PATHOLOGIC STAGE CLASSIFICATION (PTNM, AJCC 8TH EDITION)    TNM Descriptors:        - y (post-treatment)    Primary Tumor (pT):        - pT3c    Regional Lymph Nodes (pN):        - pNX    FIGO STAGE    FIGO Stage:        - IIIC     2017 June AJCC 8th Edition CAP Annual Release    Electronically signed out by:    Phong Santos M.D.    GROSS:  A. The specimen is received in formalin, labeled with the patient's name   and date of birth, and designated  \"omentum\". It consists of a 47.0 x 18.0 x 1.5 cm sheet of yellow-tan,   coarsely lobulated adipose tissue,  consistent with omentum.  Approximately 75% of the specimen is purple tan   and indurated, suggestive of tumor  deposit.  Representative sections are submitted in two cassettes.    B. The specimen is received in formalin, labeled with the patient's name   and date of birth, and designated \"11  left paracolic gutter\". It consists of a 7.0 x 3.0 x 0.8 cm portion of   pink-tan and focally indurated adipose  tissue.  Sectioning reveals a yellow-white, focally fibrotic cut surface.    No discreet tumor nodules are  identified grossly.  Representative sections are submitted in two   cassettes.    C. The specimen is received in formalin, labeled with the patien t's name   and date of birth, and designated  \"uterus, " "cervix, bilateral fallopian tubes and ovaries\". It consists of a   299.2 g uterus with attached  bilateral adnexa. The uterus measures 12.0 cm fundus to os x 6.0 cm cornu   to cornu x 8.5 cm anterior to  posterior. The uterine serosa is pink-tan and focally nodular. The   ectocervical mucosa is tan-white, focally  hemorrhagic, and glistening with a 0.4 cm patent os. The specimen is   opened to reveal a 5.0 cm cornu to cornu  x 6.0 cm in length endometrial cavity. The endometrium is red-tan and   measures to 0.2 cm in thickness.  Multiple red-tan, benign-appearing endometrial polyps are noted, ranging   from 0.5-6.0 cm in greatest  dimension.  The endocervical canal measures 5.5 cm in length and is   covered with a pale pink-tan, normal  herringbone patterned mucosa. The specimen is sectioned to reveal multiple   tan-white well-circumscribed  myometrial nodules with whorled cut surfaces, ranging from 0.5-5.0 cm in   greatest dimensi on. The myometrium is  pink-tan and slightly trabeculated with a maximum thickness of 5.0 cm.    The right fimbriated fallopian tube measures 6.5 cm in length x 0.4-0.7 cm   in diameter. Sectioning reveals a  patent, unremarkable lumen measuring up to 0.5 cm in diameter. The right   ovary is yellow-tan and cerebriform,  and measures 2.0 x 1.8 x 1.2 cm. Sectioning reveals no discrete lesions or   abnormalities. The left fimbriated  fallopian tube measures 4.5 cm in length x 0.5-0.8 cm in diameter.   Sectioning reveals a patent, unremarkable  lumen measuring up to 0.7 cm in diameter. The left ovary is yellow-tan and   cerebriform, and measures 2.0 x 1.5  x 1.2 cm. Sectioning reveals no discrete lesions or abnormalities.   Representative sections are submitted.  This case was reviewed with Dr. Santos    Summary of Sections:  C1 - anterior cervix  C2 - posterior cervix  C3 - anterior PIPER  C4 - posterior PIPER  C5-C9 - anterior full thickness endomyometrium (split sections in C5-C7,   and C " "8-C9)  C10-C11 - posterior full thickness endomyometrium  C12 - right paracervical margin, en face  C13 - left paracervical margin, en face  C14 - right fallopian tube  C15 - right ovary  C16 - left fallopian tube  C17 - left ovary    Following initial microscopy, the entirety of the bilateral adnexa are   submitted as follows:  C18NG - left fallopian tube  C19NG - left ovary  [id number] - right fallopian tube  [id number] - right ovary    D. The specimen is received in formalin, labeled with the patient's name   and date of birth, and designated  \"right pericolic peritoneum\". It consists of a 5.0 x 3.0 x 0.5 cm portion   of pink-white, rubbery adipose  tissue.  Sectioning reveals no discrete lesions or tumor deposits.    Serially sectioned and entirely submitted  in two cassettes. (Dictated by: GOLD Diaz([facility]) 11/2/2018 10:27   AM)    MICROSCOPIC:  A. Section of omentum show diffuse involvement by high grade serous   carcinoma    B. Sections of soft tissue show high grade se navjot carcinoma.    C. Sections show high-grade serous carcinoma involving uterine serosa,   peritubal soft tissue and the surface  of the left ovary.  The endometrium demonstrates endometrial polyp   associated with benign proliferative  endometrium.  The myometrium shows leiomyoma.  Sections of cervix are   benign.    D. Sections of soft tissue show metastatic high-grade serous carcinoma    CPT Codes:  A: 71545-BT9  B: 84529-CW3  C: 22385-SW4  D: 65610-NP1    TESTING LAB LOCATION:  [address]  [address] [phone]    COLLECTION SITE:  Client: [facility]  Location: [facility] (S)     Cytology non gyn   Result Value Ref Range    Copath Report       Patient Name: BHUMIKA CONNOLLY  MR#: [id number]  Specimen #: [id number]  Collected: 11/1/2018  Received: 11/6/2018  Reported: 11/7/2018 11:33  Ordering Phy(s): NAILA YOST    For improved result formatting, " select 'View Enhanced Report Format' under   Linked Documents section.    SPECIMEN/STAIN PROCESS:  Pelvic Washing       Pap-Cyto x 1, Cell Block w/ H&E-Cyto x 1, Cell Block, Level 2 x 1,   Cell Block, Level 3 x 1    ----------------------------------------------------------------  CYTOLOGIC INTERPRETATION:     Pelvic Washing:   Atypical.  Acute inflammation present.  Specimen Adequacy: Satisfactory for evaluation.    COMMENT:  A single atypical cell group is present on the cytospin with degenerative   changes. The findings are atypical  but does not meet diagnostic criteria for malignancy and cannot be further   characterized.    This case was reviewed in consultation with Dr. Guadalupe, who concurs with   the interpretation.    I have personally reviewed all specimens a nd/or slides, including the   listed special stains, and used them  with my medical judgement to determine or confirm the final diagnosis.    Electronically signed out by:    Gerhard Ascencio M.D.    Processed and screened at [facility]    CLINICAL HISTORY:  Serous carcinoma.    ,    GROSS:  Pelvic Washing:  Received 120 ml of red, cloudy fluid, processed as 1 Pap   stained Autocyte and one hematoxylin  and eosin stained cell block.    MICROSCOPIC:  The pap stained slide shows a single atypical cell group with markedly   enlarged nuclei and prominent nucleoli  with degenerative changes. The background shows acute inflammation and   rare mesothelial cells. No atypical  groups are present on cell block.    CPT Codes:  A: 83085-QUVZFXI, 23905-BZX, HCB    TESTING LAB LOCATION:  [facility]    [phone]    COLLECTION SITE:  Client:  [facility]  Location:  [facility] (S)     Platelet count   Result Value Ref Range    Platelet Count 132 (L) 150 - 450 10e9/L   Creatinine   Result Value Ref Range    Creatinine 0.78 0.52 - 1.04 mg/dL    GFR Estimate 75 >60 mL/min/1.7m2    GFR  Estimate If Black >90 >60 mL/min/1.7m2   Comprehensive metabolic panel   Result Value Ref Range    Sodium 139 133 - 144 mmol/L    Potassium 4.0 3.4 - 5.3 mmol/L    Chloride 107 94 - 109 mmol/L    Carbon Dioxide 26 20 - 32 mmol/L    Anion Gap 6 3 - 14 mmol/L    Glucose 153 (H) 70 - 99 mg/dL    Urea Nitrogen 8 7 - 30 mg/dL    Creatinine 0.77 0.52 - 1.04 mg/dL    GFR Estimate 76 >60 mL/min/1.7m2    GFR Estimate If Black >90 >60 mL/min/1.7m2    Calcium 7.6 (L) 8.5 - 10.1 mg/dL    Bilirubin Total 0.5 0.2 - 1.3 mg/dL    Albumin 3.1 (L) 3.4 - 5.0 g/dL    Protein Total 6.1 (L) 6.8 - 8.8 g/dL    Alkaline Phosphatase 38 (L) 40 - 150 U/L    ALT 18 0 - 50 U/L    AST 15 0 - 45 U/L   CBC with platelets differential   Result Value Ref Range    WBC 6.8 4.0 - 11.0 10e9/L    RBC Count 4.16 3.8 - 5.2 10e12/L    Hemoglobin 11.6 (L) 11.7 - 15.7 g/dL    Hematocrit 36.3 35.0 - 47.0 %    MCV 87 78 - 100 fl    MCH 27.9 26.5 - 33.0 pg    MCHC 32.0 31.5 - 36.5 g/dL    RDW 18.4 (H) 10.0 - 15.0 %    Platelet Count 148 (L) 150 - 450 10e9/L    Diff Method Automated Method     % Neutrophils 83.8 %    % Lymphocytes 8.0 %    % Monocytes 8.0 %    % Eosinophils 0.1 %    % Basophils 0.0 %    % Immature Granulocytes 0.1 %    Nucleated RBCs 0 0 /100    Absolute Neutrophil 5.7 1.6 - 8.3 10e9/L    Absolute Lymphocytes 0.5 (L) 0.8 - 5.3 10e9/L    Absolute Monocytes 0.5 0.0 - 1.3 10e9/L    Absolute Eosinophils 0.0 0.0 - 0.7 10e9/L    Absolute Basophils 0.0 0.0 - 0.2 10e9/L    Abs Immature Granulocytes 0.0 0 - 0.4 10e9/L    Absolute Nucleated RBC 0.0    Glucose by meter   Result Value Ref Range    Glucose 106 (H) 70 - 99 mg/dL   Platelet count   Result Value Ref Range    Platelet Count 133 (L) 150 - 450 10e9/L   Creatinine   Result Value Ref Range    Creatinine 0.67 0.52 - 1.04 mg/dL    GFR Estimate 89 >60 mL/min/1.7m2    GFR Estimate If Black >90 >60 mL/min/1.7m2   ABO/Rh type and screen   Result Value Ref Range    ABO O     RH(D) Pos     Antibody Screen Neg      Test Valid Only At [facility]        Specimen Expires 11/04/2018        ASSESSMENT/PLAN:               ICD-10-CM    1. Essential hypertension, benign-likely from chemo naa avastin -issue of starting Rx  I10 metoprolol succinate ER (TOPROL-XL) 50 MG 24 hr tablet     CANCELED: TSH WITH FREE T4 REFLEX   2. Tachycardia-issue of treating  R00.0 metoprolol succinate ER (TOPROL-XL) 50 MG 24 hr tablet   3. Peritoneal carcinomatosis (H) 11-18 resection of omentum & TSH/BSO  Rx chemo  C78.6     C80.1    4. 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 Impaired fasting glucose R73.01    6. Class 1 obesity due to excess calories with serious comorbidity and body mass index (BMI) of 34.0 to 34.9 in adult E66.09     Z68.34        Patient Instructions   1. To decrease your blood pressure:     1) decrease your salt in your diet   2) increase greens   3) decrease red meat   4) increase fiber in diet   5) increase exercise   6) lose weight (if you are over weight )       2.  Weight Loss Tips  1. Do not eat after 6 hrs before your expected bedtime  2. Have your heaviest meal for breakfast, a slightly lighter meal at lunch and a snack 6 hrs before bed  3. No sugar/calorie drinks except milk ie no fruit juice, pop, alcohol.  4. Drink milk 30min before meals to decrease your hunger. Also it is excellent as part of your last meal of the day snack  5. Drink lots of water  6. Increase fiber in diet: all bran cereal, salads, popcorn etc  7. Have only one small serving of fruit a day about 1/2 cup (as this is high in sugar)  8. EXERCISE is the bottom line. Without it, you will gain weight even on a low calorie diet. Best if done 2-3X a day as can    Being overweight contributes to high blood pressure and high cholesterol, both of which cause heart attacks, strokes and kidney failure, prediabetes and diabetes, arthritis, and liver disease     4. Your blood pressure has been high or you are  starting a new medication for it : metoprolol###  Please take 2-4 blood pressures and heart rates a week in 2-3 weeks  and write them down with date, time of day and location and bring this record in in 3-5 weeks. The optimal blood  pressure is < 140/80 or close to this most of the time.        need to chek in 2wks as wants to be able to have chemo then and cannot if BP still too high     Aga Child MD  [facility]    Weight management plan: Discussed healthy diet and exercise guidelines    Aga Child MD

## 2019-03-19 NOTE — PATIENT INSTRUCTIONS
1. To decrease your blood pressure:     1) decrease your salt in your diet   2) increase greens   3) decrease red meat   4) increase fiber in diet   5) increase exercise   6) lose weight (if you are over weight )       2.  Weight Loss Tips  1. Do not eat after 6 hrs before your expected bedtime  2. Have your heaviest meal for breakfast, a slightly lighter meal at lunch and a snack 6 hrs before bed  3. No sugar/calorie drinks except milk ie no fruit juice, pop, alcohol.  4. Drink milk 30min before meals to decrease your hunger. Also it is excellent as part of your last meal of the day snack  5. Drink lots of water  6. Increase fiber in diet: all bran cereal, salads, popcorn etc  7. Have only one small serving of fruit a day about 1/2 cup (as this is high in sugar)  8. EXERCISE is the bottom line. Without it, you will gain weight even on a low calorie diet. Best if done 2-3X a day as can    Being overweight contributes to high blood pressure and high cholesterol, both of which cause heart attacks, strokes and kidney failure, prediabetes and diabetes, arthritis, and liver disease     4. Your blood pressure has been high or you are starting a new medication for it : metoprolol###  Please take 2-4 blood pressures and heart rates a week in 2-3 weeks  and write them down with date, time of day and location and bring this record in in 3-5 weeks. The optimal blood  pressure is < 140/80 or close to this most of the time.

## 2019-03-19 NOTE — NURSING NOTE
"Chief Complaint   Patient presents with     Recheck Medication     /90   Pulse 123   Temp 96.2  F (35.7  C) (Tympanic)   Resp 20   Ht 1.727 m (5' 8\")   Wt 103.9 kg (229 lb)   LMP  (LMP Unknown)   SpO2 97%   Breastfeeding? No   BMI 34.82 kg/m   Estimated body mass index is 34.82 kg/m  as calculated from the following:    Height as of this encounter: 1.727 m (5' 8\").    Weight as of this encounter: 103.9 kg (229 lb).  BP completed using cuff size: victoria Porter CMA    Health Maintenance Due   Topic Date Due     PREVENTIVE CARE VISIT  1955     TSH Q1 YEAR  03/14/1958     HIV SCREEN (SYSTEM ASSIGNED)  03/14/1975     HEPATITIS C SCREENING  03/14/1975     PAP SCREENING Q3 YR (SYSTEM ASSIGNED)  03/14/1978     DTAP/TDAP/TD IMMUNIZATION (1 - Tdap) 03/14/1982     MAMMO SCREEN Q2 YR (SYSTEM ASSIGNED)  03/14/1997     LIPID SCREEN Q5 YR FEMALE (SYSTEM ASSIGNED)  03/14/2002     COLON CANCER SCREEN (SYSTEM ASSIGNED)  03/14/2007     ZOSTER IMMUNIZATION (1 of 2) 03/14/2007     Health Maintenance reviewed at today's visit patient asked to schedule/complete:   Routine Health Visit: Patient agrees to schedule  Breast Cancer:  Patient agrees to schedule  Cervical Cancer:  Patient agrees to schedule  Immunizations:  Patient agrees to schedule    "

## 2019-03-28 NOTE — PROGRESS NOTES
SUBJECTIVE:   Carie Vaughn is a 64 year old female who presents to clinic today for the following health issues:    Hypertension Follow-up      Outpatient blood pressures are being checked at home.  Results are< 140/90.mostly and cont to come down since started toprol 4 weeks ago     Low Salt Diet: not monitoring salt    Was from the vastin=chemo     DEPRESSION     -severe and worsening   -terminal ca pt on chemo   -refuses meds     NONMORBID OBESITY       - BMI= 34.8     -little wt loss with the chemo and is trying to keep wt up per oncol       Amount of exercise or physical activity: None    Problems taking medications regularly: No    Medication side effects: none    Diet: regular (no restrictions)        Problem list and histories reviewed & adjusted, as indicated.  Additional history: as documented    Labs reviewed in EPIC    Reviewed and updated as needed this visit by clinical staff  Tobacco  Allergies  Meds  Problems  Med Hx  Surg Hx  Fam Hx  Soc Hx        Reviewed and updated as needed this visit by Provider         ROS:  CONSTITUTIONAL: NEGATIVE for fever, chills, change in weight  CONSTITUTIONAL:POSITIVE  for , fatigue, malaise, myalgias and weight loss  INTEGUMENTARY/SKIN: NEGATIVE for worrisome rashes, moles or lesions  EYES: NEGATIVE for vision changes or irritation  ENT/MOUTH: NEGATIVE for ear, mouth and throat problems  RESP: NEGATIVE for significant cough or SOB  BREAST: NEGATIVE for masses, tenderness or discharge  CV: NEGATIVE for chest pain, palpitations or peripheral edema  GI: NEGATIVE for nausea, abdominal pain, heartburn, or change in bowel habits  : NEGATIVE for frequency, dysuria, or hematuria  MUSCULOSKELETAL: NEGATIVE for significant arthralgias or myalgia  NEURO: NEGATIVE for weakness, dizziness or paresthesias  ENDOCRINE: NEGATIVE for temperature intolerance, skin/hair changes  HEME: NEGATIVE for bleeding problems  PSYCHIATRIC: NEGATIVE for changes in mood or  "affect    OBJECTIVE:     /80   Pulse 78   Temp 96.4  F (35.8  C) (Tympanic)   Resp 16   Ht 1.727 m (5' 8\")   Wt 101.2 kg (223 lb)   LMP  (LMP Unknown)   SpO2 97%   Breastfeeding? No   BMI 33.91 kg/m    Body mass index is 33.91 kg/m .  GENERAL: healthy, alert, no distress, obese, pale, frail, elderly, fatigued and appears older than stated age  EYES: Eyes grossly normal to inspection, PERRL and conjunctivae and sclerae normal  RESP: lungs clear to auscultation - no rales, rhonchi or wheezes  CV: regular rate and rhythm, normal S1 S2, no S3 or S4, no murmur, click or rub, no peripheral edema and peripheral pulses strong  MS: no gross musculoskeletal defects noted, no edema  SKIN: no suspicious lesions or rashes  NEURO: Normal strength and tone, mentation intact and speech normal  PSYCH: mentation appears normal, affect normal/bright          ASSESSMENT/PLAN:         Declined immunizations:  due to Other   Pt terminal ca      ICD-10-CM    1. Essential hypertension, benign-likely from chemo naa avastin -issue of starting Rx -now controlled on meds I10    2. Current severe episode of major depressive disorder without psychotic features without prior episode (H)-terminal ca F32.2        Patient Instructions   1. Follow all BPs and chart them to me in 2 weeks    2. Ask psych re behavioral health training /conditioning   cognitive behavioral    Aga Child MD  [facility]        "

## 2019-03-29 NOTE — NURSING NOTE
"Chief Complaint   Patient presents with     Recheck Medication     /80   Pulse 78   Temp 96.4  F (35.8  C) (Tympanic)   Resp 16   Ht 1.727 m (5' 8\")   Wt 101.2 kg (223 lb)   LMP  (LMP Unknown)   SpO2 97%   Breastfeeding? No   BMI 33.91 kg/m   Estimated body mass index is 33.91 kg/m  as calculated from the following:    Height as of this encounter: 1.727 m (5' 8\").    Weight as of this encounter: 101.2 kg (223 lb).  BP completed using cuff size: victoria Porter CMA    Health Maintenance Due   Topic Date Due     PREVENTIVE CARE VISIT  1955     TSH Q1 YEAR  03/14/1958     HIV SCREEN (SYSTEM ASSIGNED)  03/14/1975     HEPATITIS C SCREENING  03/14/1975     PAP SCREENING Q3 YR (SYSTEM ASSIGNED)  03/14/1978     DTAP/TDAP/TD IMMUNIZATION (1 - Tdap) 03/14/1982     MAMMO SCREEN Q2 YR (SYSTEM ASSIGNED)  03/14/1997     LIPID SCREEN Q5 YR FEMALE (SYSTEM ASSIGNED)  03/14/2002     COLON CANCER SCREEN (SYSTEM ASSIGNED)  03/14/2007     ZOSTER IMMUNIZATION (1 of 2) 03/14/2007     Health Maintenance reviewed at today's visit patient asked to schedule/complete:   Routine Health Visit: Patient agrees to schedule  Breast Cancer:  Patient agrees to schedule  Cervical Cancer:  Patient agrees to schedule  Colon Cancer:  Patient agrees to schedule  Immunizations:  Patient agrees to schedule    "

## 2019-03-29 NOTE — PATIENT INSTRUCTIONS
1. Follow all BPs and chart them to me in 2 weeks    2. Ask psych re behavioral health training /conditioning

## 2019-03-31 PROBLEM — F32.2 CURRENT SEVERE EPISODE OF MAJOR DEPRESSIVE DISORDER WITHOUT PSYCHOTIC FEATURES WITHOUT PRIOR EPISODE (H): Status: ACTIVE | Noted: 2019-01-01

## 2019-04-01 NOTE — TELEPHONE ENCOUNTER
"Last OV 03/29/2019.    Requested Prescriptions   Pending Prescriptions Disp Refills     metoprolol succinate ER (TOPROL-XL) 50 MG 24 hr tablet 30 tablet 0     Sig: Take 1 tablet (50 mg) by mouth daily    Beta-Blockers Protocol Passed - 3/30/2019  2:35 PM       Passed - Blood pressure under 140/90 in past 12 months    BP Readings from Last 3 Encounters:   03/29/19 138/80   03/19/19 160/88   01/02/19 160/80                Passed - Patient is age 6 or older       Passed - Recent (12 mo) or future (30 days) visit within the authorizing provider's specialty    Patient had office visit in the last 12 months or has a visit in the next 30 days with authorizing provider or within the authorizing provider's specialty.  See \"Patient Info\" tab in inbasket, or \"Choose Columns\" in Meds & Orders section of the refill encounter.             Passed - Medication is active on med list          "

## 2019-04-04 NOTE — TELEPHONE ENCOUNTER
Patient is in need of this medication. 30 days supply was given last month because she needed to follow up with provider. This was completed and now refill needed. Routed to refills.

## 2019-04-24 NOTE — TELEPHONE ENCOUNTER
Pt was advised to schedule an office visit to discuss adding new BP medication. States she will contact her oncologist since her PCP is out of the office this week. Pt agreed to call clinic back if her oncologist is not able to prescribe new BP medication.

## 2019-05-02 PROBLEM — T50.905A ADVERSE EFFECT OF DRUG, INITIAL ENCOUNTER: Status: ACTIVE | Noted: 2019-01-01

## 2019-05-02 NOTE — NURSING NOTE
"Chief Complaint   Patient presents with     Recheck Medication     /70   Pulse 73   Temp 97.4  F (36.3  C) (Tympanic)   Resp 20   Ht 1.727 m (5' 8\")   Wt 101.2 kg (223 lb)   LMP  (LMP Unknown)   SpO2 97%   Breastfeeding? No   BMI 33.91 kg/m   Estimated body mass index is 33.91 kg/m  as calculated from the following:    Height as of this encounter: 1.727 m (5' 8\").    Weight as of this encounter: 101.2 kg (223 lb).  BP completed using cuff size: regular   Noemi Porter CMA    Health Maintenance Due   Topic Date Due     PREVENTIVE CARE VISIT  1955     TSH Q1 YEAR  03/14/1958     HIV SCREEN (SYSTEM ASSIGNED)  03/14/1975     PHQ-9 Q6 MONTHS  03/14/1975     HEPATITIS C SCREENING  03/14/1975     PAP SCREENING Q3 YR (SYSTEM ASSIGNED)  03/14/1978     DTAP/TDAP/TD IMMUNIZATION (1 - Tdap) 03/14/1982     MAMMO SCREEN Q2 YR (SYSTEM ASSIGNED)  03/14/1997     LIPID SCREEN Q5 YR FEMALE (SYSTEM ASSIGNED)  03/14/2002     COLON CANCER SCREEN (SYSTEM ASSIGNED)  03/14/2007     ZOSTER IMMUNIZATION (1 of 2) 03/14/2007     Health Maintenance reviewed at today's visit patient asked to schedule/complete:   Routine Health Visit: Patient agrees to schedule  Breast Cancer:  Patient agrees to schedule  Cervical Cancer:  Patient agrees to schedule  Colon Cancer:  Patient agrees to schedule  Depression:  Patient agrees to schedule  Immunizations:  Patient agrees to schedule    "

## 2019-05-02 NOTE — PATIENT INSTRUCTIONS
1. The prescription(s) have been sent to your pharmacy electronically and the future lab has been ordered. Please call us at [phone] to make a lab appointment.   Increase the toprol  By adding 25mgm to 50 mgm     2. Stop the lisinopril as caused N/V     3. Your blood pressure has been high or you are starting a new medication for it : increase in toprol 50 to 75 mgm and stop the lisinopril   Please take 2-4 blood pressures and heart rates a week and write them down with date, time of day and location and bring this record in in 3-5 weeks. The optimal blood  pressure is < 140/80 or close to this most of the time.send me the BPs on my chart

## 2019-05-02 NOTE — PROGRESS NOTES
SUBJECTIVE:   Carie Vaughn is a 64 year old female who presents to clinic today for the following   health issues:    Hypertension Follow-up      Outpatient blood pressures are being checked at home.  Results are< 140/90 mostly and cont to come down since started toprol XL 50mgm a d  4 weeks ago     Low Salt Diet: not monitoring salt    Was from the vastin=chemo--is a common side effect of this drug         4-24-19 -oncol started lisinopril as BP still slightly above 140/80  ie 140's/ 85-95    -LISINOPRIL REACTION      - DEVELOPED  N/V the day she started it     Not helped by compazine or zofran    -last chemo was 4-15-19       PHQ-9  English  PHQ-9   Any Language  Suicide Assessment Five-step Evaluation and Treatment (SAFE-T)            Amount of exercise or physical activity: None    Problems taking medications regularly: No    Medication side effects: none    Diet: regular (no restrictions)        Additional history: as documented    Reviewed  and updated as needed this visit by clinical staff  Tobacco  Allergies  Meds  Problems  Med Hx  Surg Hx  Fam Hx  Soc Hx          Reviewed and updated as needed this visit by Provider  Tobacco  Allergies  Meds  Problems  Med Hx  Surg Hx  Fam Hx         Labs reviewed in EPIC    ROS:  CONSTITUTIONAL: NEGATIVE for fever, chills, change in weight  INTEGUMENTARY/SKIN: NEGATIVE for worrisome rashes, moles or lesions  EYES: NEGATIVE for vision changes or irritation  ENT/MOUTH: NEGATIVE for ear, mouth and throat problems  RESP: NEGATIVE for significant cough or SOB  BREAST: NEGATIVE for masses, tenderness or discharge  CV: NEGATIVE for chest pain, palpitations or peripheral edema  GI: POSITIVE for , nausea and vomiting  : NEGATIVE for frequency, dysuria, or hematuria  MUSCULOSKELETAL: NEGATIVE for significant arthralgias or myalgia  NEURO: NEGATIVE for weakness, dizziness or paresthesias  ENDOCRINE: NEGATIVE for temperature intolerance, skin/hair  "changes  HEME: NEGATIVE for bleeding problems  PSYCHIATRIC: NEGATIVE for changes in mood or affect    OBJECTIVE:     /70   Pulse 73   Temp 97.4  F (36.3  C) (Tympanic)   Resp 20   Ht 1.727 m (5' 8\")   Wt 101.2 kg (223 lb)   LMP  (LMP Unknown)   SpO2 97%   Breastfeeding? No   BMI 33.91 kg/m    Body mass index is 33.91 kg/m .  GENERAL: healthy, alert, no distress, obese, frail, elderly and fatigued  EYES: Eyes grossly normal to inspection, PERRL and conjunctivae and sclerae normal  RESP: lungs clear to auscultation - no rales, rhonchi or wheezes  CV: regular rate and rhythm, normal S1 S2, no S3 or S4, no murmur, click or rub, no peripheral edema and peripheral pulses strong  MS: no gross musculoskeletal defects noted, no edema  SKIN: no suspicious lesions or rashes  NEURO: Normal strength and tone, mentation intact and speech normal  PSYCH: mentation appears normal, affect normal/bright    Diagnostic Test Results:  Results for orders placed or performed in visit on 02/05/19   ALT   Result Value Ref Range    ALT 16 7.0 - 52.0 IU/L    Narrative    LAB RESULT [facility]   AST   Result Value Ref Range    AST 25 13.0 - 39.0 U/L    Narrative    LAB RESULT [facility]   Creatinine   Result Value Ref Range    Creatinine 0.90 0.6 - 1.3 mg/dL    GFR Estimate 68.6 >60.0 ml/min/1.73m2    Narrative    LAB RESULT [facility]   Glucose   Result Value Ref Range    Glucose 113 (A) 70.0 - 110.0 mg/dL    Narrative    LAB RESULT [facility]   Potassium   Result Value Ref Range    Potassium 3.8 3.5 - 5.1 mmol/L    Narrative    LAB RESULT [facility]     TSH = wnl last wk at oncol     ASSESSMENT/PLAN:               ICD-10-CM    1. Essential hypertension, benign-likely from chemo naa avastin -issue of changing Rx  I10 metoprolol succinate ER (TOPROL-XL) 25 MG 24 hr tablet   2. Adverse effect of drug, initial encounter: N/V from lisinopril  T50.905A        Patient Instructions   1. The " prescription(s) have been sent to your pharmacy electronically and the future lab has been ordered. Please call us at [phone] to make a lab appointment.   Increase the toprol  By adding 25mgm to 50 mgm     2. Stop the lisinopril as caused N/V     3. Your blood pressure has been high or you are starting a new medication for it : increase in toprol 50 to 75 mgm and stop the lisinopril   Please take 2-4 blood pressures and heart rates a week and write them down with date, time of day and location and bring this record in in 3-5 weeks. The optimal blood  pressure is < 140/80 or close to this most of the time.send me the BPs on my chart       I  Explained the treatment and the reason for it   With pt and    Aga Child MD  [facility]

## 2019-06-21 NOTE — TELEPHONE ENCOUNTER
Panel Management Review      Patient has the following on her problem list:     Depression / Dysthymia review    Measure:  Needs PHQ-9 score of 4 or less during index window.  Administer PHQ-9 and if score is 5 or more, send encounter to provider for next steps.    5 - 7 month window range:     PHQ-9 SCORE 5/2/2019   PHQ-9 Total Score 5       If PHQ-9 recheck is 5 or more, route to provider for next steps.    Patient is due for:  PHQ9      Composite cancer screening  Chart review shows that this patient is due/due soon for the following Pap Smear, Mammogram and Colonoscopy  Summary:    Patient is due/failing the following:   COLONOSCOPY, MAMMOGRAM, PAP and PHYSICAL    Action needed:       Type of outreach:    Sent letter.    Questions for provider review:    None                                                                                                                                    Noemi Porter CMA       Chart routed to .

## 2019-06-21 NOTE — LETTER
June 21, 2019    Carie Vaughn  [address]  [place] [address]    Dear Denny Darnell cares about your health and your health plan.  I have reviewed your medical conditions, medication list and lab results, and am making recommendations based on this review to better manage your health.    You are in particular need of attention regarding:  -Breast Cancer Screening  -Colon Cancer Screening  -Cervical Cancer Screening  -Wellness (Physical) Visit     I am recommending that you:     -schedule a WELLNESS (Physical) APPOINTMENT with me.   I will check fasting labs the same day - nothing to eat except water and meds for 8-10 hours prior.      -schedule a MAMMOGRAM which is due.    1 in 8 women will develop invasive breast cancer during her lifetime and it is the most common non-skin cancer in American women.  EARLY detection, new treatments, and a better understanding of the disease have increased survival rates - the 5 year survival rate in the 1960s was 63% and today it is close to 90%.    If you are under/uninsured, we recommend you contact the [facility]. They offer mammograms at no charge or on a sliding fee charge. You can schedule with them at [phone]. Please have them send us the results.      Please disregard this reminder if you have had this exam elsewhere within the last year.  It would be helpful for us to have a copy of your mammogram report in your file so that we can best coordinate your care - please contact us with when your test was done so we can update your record.             -schedule a COLONOSCOPY to look for colon cancer (due every 10 years or 5 years in higher risk situations.)        Colon cancer is now the second leading cause of cancer-related deaths in the [place] for both men and women and there are over 130,000 new cases and 50,000 deaths per year from colon cancer.  Colonoscopies can prevent 90-95% of these deaths.  Problem lesions can be removed  before they ever become cancer.  This test is not only looking for cancer, but also getting rid of precancerious lesions.    If you are under/uninsured, we recommend you contact the [facility] program. [facility] is a free colorectal cancer screening program that provides colonoscopies for eligible under/uninsured [place] men and women. If you are interested in receiving a free colonoscopy, please call [facility] at [phone] (mention code [facility]) to see if you re eligible.      If you do not wish to do a colonoscopy or cannot afford to do one, at this time, there is another option. It is called a FIT test or Fecal Immunochemical Occult Blood Test (take home stool sample kit).  It does not replace the colonoscopy for colorectal cancer screening, but it can detect hidden bleeding in the lower colon.  It does need to be repeated every year and if a positive result is obtained, you would be referred for a colonoscopy.          If you have completed either one of these tests at another facility, please call with the details of when and where the tests were done and if they were normal or not. Or have the records sent to our clinic so that we can best coordinate your care.    -schedule a PAP SMEAR EXAM which is due.  Please disregard this reminder if you have had this exam elsewhere within the last year.  It would be helpful for us to have a copy of your recent pap smear report in our file so that we can best coordinate your care.    If you are under/uninsured, we recommend you contact the [facility]. They offer pap smears at no charge or on a sliding fee charge. You can schedule with them at [phone]. Please have them send us the results.      Please call us at the [facility] location:  [phone] or use [facility] to address the above recommendations.     Thank you for trusting [facility].  We appreciate the opportunity to serve you and look forward to supporting your healthcare in the  future.    If you have (or plan to have) any of these tests done at a facility other than a [facility] or a [facility], please have the results sent to the [facility] location noted above.      Best Regards,    Aga Child MD

## 2019-07-30 NOTE — PROGRESS NOTES
[facility] Discharge Nursing Note    Education/questions answered: Yes  Patient DC location: home  Accompanied by:   CS discharge time: 1500

## 2019-07-30 NOTE — DISCHARGE INSTRUCTIONS

## 2019-07-30 NOTE — PROGRESS NOTES
[facility] Admission Nursing Note    Reason for admission: Patient arrived ambulatory to [facility] for paracentesis. Patient is lethargic, oriented, cooperative. Denies pain.  CS arrival time: 1230  Accompanied by:   Name/phone of DC : Silvano-   Medications held: has limited ability to take meds or nutrition due to advanced cancer.  Consent signed: Will be signed with MD in Ultrasound department  Abnormal assessment/labs: No  If abnormal, provider notified: N/A  Education/questions answered: pt has no questions about procedure  Plan: To Ultrasound for paracentesis

## 2019-07-30 NOTE — PROGRESS NOTES
Paracentesis:     Pt tolerated well. VSS. 2,550 cc yellow fluid removed from abdomen w/o difficulty. Bandaid applied to site - CDI.     Pt back to [facility]. Detailed report given to DANIEL Diamond.

## 2019-08-02 NOTE — TELEPHONE ENCOUNTER
"Requested Prescriptions   Pending Prescriptions Disp Refills     metoprolol succinate ER (TOPROL-XL) 25 MG 24 hr tablet  Last Written Prescription Date:  5/2/2019  Last Fill Quantity: 90 tablet,  # refills: 0   Last office visit: 5/2/2019 with prescribing provider:  WHIT Child   Future Office Visit:     90 tablet 0     Sig: Take 1 tablet (25 mg) by mouth daily       Beta-Blockers Protocol Passed - 8/2/2019 11:35 AM        Passed - Blood pressure under 140/90 in past 12 months     BP Readings from Last 3 Encounters:   07/30/19 126/78   05/02/19 130/70   03/29/19 138/80                 Passed - Patient is age 6 or older        Passed - Recent (12 mo) or future (30 days) visit within the authorizing provider's specialty     Patient had office visit in the last 12 months or has a visit in the next 30 days with authorizing provider or within the authorizing provider's specialty.  See \"Patient Info\" tab in inbasket, or \"Choose Columns\" in Meds & Orders section of the refill encounter.              Passed - Medication is active on med list           "

## 2019-08-07 NOTE — PROGRESS NOTES
This is a recent snapshot of the patient's [facility] medical record.  For current drug dose and complete information and questions, call [phone] or In Basket pool, [facility]v home infusion ([id number])  CSN Number:  [id number]

## 2019-08-08 NOTE — PROGRESS NOTES
This is a recent snapshot of the patient's [facility] medical record.  For current drug dose and complete information and questions, call [phone] or In Basket pool, fv home infusion ([id number])  CSN Number:  [id number]

## 2019-08-12 ENCOUNTER — HOME INFUSION (PRE-WILLOW HOME INFUSION) (OUTPATIENT)
Dept: PHARMACY | Facility: CLINIC | Age: 64
End: 2019-08-12

## 2019-08-14 NOTE — PROGRESS NOTES
This is a recent snapshot of the patient's [facility] medical record.  For current drug dose and complete information and questions, call [phone] or In Basket pool, fv home infusion ([id number])  CSN Number:  [id number]

## 2021-05-30 ENCOUNTER — RECORDS - HEALTHEAST (OUTPATIENT)
Dept: ADMINISTRATIVE | Facility: CLINIC | Age: 66
End: 2021-05-30

## 2022-09-21 NOTE — NURSING NOTE
"Chief Complaint   Patient presents with     Pre-Op Exam     initial /81 (BP Location: Right arm, Cuff Size: Adult Large)  Pulse 87  Temp 97.1  F (36.2  C) (Oral)  Resp 16  Ht 5' 8\" (1.727 m)  Wt 239 lb 8 oz (108.6 kg)  SpO2 98%  BMI 36.42 kg/m2 Estimated body mass index is 36.42 kg/(m^2) as calculated from the following:    Height as of this encounter: 5' 8\" (1.727 m).    Weight as of this encounter: 239 lb 8 oz (108.6 kg).  BP completed using cuff size: large.   R arm      Health Maintenance that is potentially due pending provider review:  NONE    n/a    Sree Penn ma  " Retention Suture Text: Retention sutures were placed to support the closure and prevent dehiscence.

## 2024-04-24 NOTE — PROCEDURES
RADIOLOGY POST PROCEDURE NOTE w/ SEDATION  Patient name: Carie Vaughn  MRN: [id number]  : 1955    Pre-procedure diagnosis: Port and catheter placement  Post-procedure diagnosis: Same    Procedure Date/Time: 2018  2:34 PM  Procedure: Right Int Jug power port and catheter with tip at RA/SVC junction.  Heparinized.  No complications.  Ready for use.  Estimated blood loss: 10 ml  Specimen(s) collected with description: Please see above.    I determined this patient to be an appropriate candidate for the planned sedation and procedure and reassessed the patient IMMEDIATELY PRIOR to sedation and procedure.     The patient tolerated the procedure well with no immediate complications.  Significant findings:  Please see above.    See imaging dictation for procedural details.    Provider name: Cameron Marion  Assistant(s):None  
No

## (undated) DEVICE — SPONGE LAP 18X18" X8435

## (undated) DEVICE — PREP SKIN SCRUB TRAY 4461A

## (undated) DEVICE — SU VICRYL 3-0 SH 27" J316H

## (undated) DEVICE — SU ETHIBOND 1 CT-1 30" X425H

## (undated) DEVICE — SPONGE KITTNER 31001010

## (undated) DEVICE — DRAPE MAYO STAND 23X54 8337

## (undated) DEVICE — SU VICRYL 0 TIE 12X18" J906G

## (undated) DEVICE — ESU LIGASURE IMPACT OPEN SEALER/DVDR CVD LG JAW LF4418

## (undated) DEVICE — DRAPE LEGGINGS 8421

## (undated) DEVICE — DRAIN JACKSON PRATT 15FR ROUND SIL LF JP-2229

## (undated) DEVICE — DRAPE SHEET REV FOLD 3/4 9349

## (undated) DEVICE — LINEN TOWEL PACK X5 5464

## (undated) DEVICE — GLOVE BIOGEL PI ULTRATOUCH G SZ 6.5 42165

## (undated) DEVICE — CLIP HORIZON MED BLUE 002200

## (undated) DEVICE — Device

## (undated) DEVICE — SU PDS II 1 TP-1 48" Z880G

## (undated) DEVICE — BNDG ABDOMINAL BINDER 9X45-62" 79-89071

## (undated) DEVICE — SU VICRYL 2-0 TIE 12X18" J905T

## (undated) DEVICE — SOL WATER IRRIG 1000ML BOTTLE 2F7114

## (undated) DEVICE — ESU GROUND PAD UNIVERSAL W/O CORD

## (undated) DEVICE — BARRIER SEPRAFILM 5X6" SINGLE SHEET 4301-02

## (undated) DEVICE — SU VICRYL 0 CT-1 27" UND J260H

## (undated) DEVICE — STPL SKIN PROXIMATE 35 WIDE PMW35

## (undated) DEVICE — DRAPE CV SPLIT II 147X106" 9158

## (undated) DEVICE — SPONGE RAY-TEC 4X4" 7317

## (undated) DEVICE — SU VICRYL 0 CT-2 27" J334H

## (undated) DEVICE — DRSG TELFA ISLAND 4X14" 7544

## (undated) DEVICE — GLOVE PROTEXIS MICRO 6.5  2D73PM65

## (undated) DEVICE — SU VICRYL 2-0 SH 27" J317H

## (undated) DEVICE — WIPES FOLEY CARE SURESTEP PROVON DFC100

## (undated) DEVICE — VESSEL LOOPS RED MAXI

## (undated) DEVICE — KIT ABDOMINAL HYST SMA15AHFSM

## (undated) DEVICE — GLOVE PROTEXIS BLUE W/NEU-THERA 6.5  2D73EB65

## (undated) DEVICE — SUCTION CANISTER MEDIVAC LINER 3000ML W/LID 65651-530

## (undated) DEVICE — CATH TRAY FOLEY SURESTEP 16FR WDRAIN BAG STLK LATEX A300316A

## (undated) RX ORDER — PROPOFOL 10 MG/ML
INJECTION, EMULSION INTRAVENOUS
Status: DISPENSED
Start: 2018-01-01

## (undated) RX ORDER — HYDROMORPHONE HYDROCHLORIDE 1 MG/ML
INJECTION, SOLUTION INTRAMUSCULAR; INTRAVENOUS; SUBCUTANEOUS
Status: DISPENSED
Start: 2018-01-01

## (undated) RX ORDER — FENTANYL CITRATE 50 UG/ML
INJECTION, SOLUTION INTRAMUSCULAR; INTRAVENOUS
Status: DISPENSED
Start: 2018-01-01

## (undated) RX ORDER — CEFAZOLIN SODIUM 2 G/100ML
INJECTION, SOLUTION INTRAVENOUS
Status: DISPENSED
Start: 2018-01-01

## (undated) RX ORDER — CEFAZOLIN SODIUM 1 G/3ML
INJECTION, POWDER, FOR SOLUTION INTRAMUSCULAR; INTRAVENOUS
Status: DISPENSED
Start: 2018-01-01

## (undated) RX ORDER — ONDANSETRON 2 MG/ML
INJECTION INTRAMUSCULAR; INTRAVENOUS
Status: DISPENSED
Start: 2018-01-01

## (undated) RX ORDER — KETOROLAC TROMETHAMINE 30 MG/ML
INJECTION, SOLUTION INTRAMUSCULAR; INTRAVENOUS
Status: DISPENSED
Start: 2018-01-01

## (undated) RX ORDER — MEPERIDINE HYDROCHLORIDE 25 MG/ML
INJECTION INTRAMUSCULAR; INTRAVENOUS; SUBCUTANEOUS
Status: DISPENSED
Start: 2018-01-01